# Patient Record
Sex: FEMALE | Race: BLACK OR AFRICAN AMERICAN | NOT HISPANIC OR LATINO
[De-identification: names, ages, dates, MRNs, and addresses within clinical notes are randomized per-mention and may not be internally consistent; named-entity substitution may affect disease eponyms.]

---

## 2020-10-13 ENCOUNTER — APPOINTMENT (OUTPATIENT)
Dept: HUMAN REPRODUCTION | Facility: CLINIC | Age: 30
End: 2020-10-13
Payer: COMMERCIAL

## 2020-10-13 PROCEDURE — 99204 OFFICE O/P NEW MOD 45 MIN: CPT | Mod: 95

## 2020-10-28 ENCOUNTER — APPOINTMENT (OUTPATIENT)
Dept: HUMAN REPRODUCTION | Facility: CLINIC | Age: 30
End: 2020-10-28

## 2020-11-10 ENCOUNTER — TRANSCRIPTION ENCOUNTER (OUTPATIENT)
Age: 30
End: 2020-11-10

## 2023-05-09 ENCOUNTER — APPOINTMENT (OUTPATIENT)
Dept: OBGYN | Facility: HOSPITAL | Age: 33
End: 2023-05-09

## 2023-05-09 ENCOUNTER — TRANSCRIPTION ENCOUNTER (OUTPATIENT)
Age: 33
End: 2023-05-09

## 2023-05-09 ENCOUNTER — OUTPATIENT (OUTPATIENT)
Dept: OUTPATIENT SERVICES | Facility: HOSPITAL | Age: 33
LOS: 1 days | End: 2023-05-09

## 2023-05-09 LAB
HCG UR QL: POSITIVE
QUALITY CONTROL: YES

## 2023-05-10 DIAGNOSIS — Z32.00 ENCOUNTER FOR PREGNANCY TEST, RESULT UNKNOWN: ICD-10-CM

## 2023-06-13 ENCOUNTER — RESULT REVIEW (OUTPATIENT)
Age: 33
End: 2023-06-13

## 2023-06-13 ENCOUNTER — NON-APPOINTMENT (OUTPATIENT)
Age: 33
End: 2023-06-13

## 2023-06-13 ENCOUNTER — APPOINTMENT (OUTPATIENT)
Dept: OBGYN | Facility: HOSPITAL | Age: 33
End: 2023-06-13

## 2023-06-13 ENCOUNTER — OUTPATIENT (OUTPATIENT)
Dept: OUTPATIENT SERVICES | Facility: HOSPITAL | Age: 33
LOS: 1 days | End: 2023-06-13

## 2023-06-13 VITALS
TEMPERATURE: 98.1 F | DIASTOLIC BLOOD PRESSURE: 72 MMHG | BODY MASS INDEX: 24.59 KG/M2 | WEIGHT: 153 LBS | SYSTOLIC BLOOD PRESSURE: 118 MMHG | HEIGHT: 66 IN | HEART RATE: 88 BPM

## 2023-06-13 DIAGNOSIS — O30.041 TWIN PREGNANCY, DICHORIONIC/DIAMNIOTIC, FIRST TRIMESTER: ICD-10-CM

## 2023-06-13 DIAGNOSIS — Z82.2 FAMILY HISTORY OF DEAFNESS AND HEARING LOSS: ICD-10-CM

## 2023-06-13 DIAGNOSIS — F12.91 CANNABIS USE, UNSPECIFIED, IN REMISSION: ICD-10-CM

## 2023-06-13 DIAGNOSIS — J45.909 UNSPECIFIED ASTHMA, UNCOMPLICATED: ICD-10-CM

## 2023-06-13 DIAGNOSIS — Z34.90 ENCOUNTER FOR SUPERVISION OF NORMAL PREGNANCY, UNSPECIFIED, UNSPECIFIED TRIMESTER: ICD-10-CM

## 2023-06-13 LAB
24R-OH-CALCIDIOL SERPL-MCNC: 49.1 NG/ML — SIGNIFICANT CHANGE UP (ref 30–80)
A1C WITH ESTIMATED AVERAGE GLUCOSE RESULT: 5.1 % — SIGNIFICANT CHANGE UP (ref 4–5.6)
ALBUMIN SERPL ELPH-MCNC: 4.9 G/DL — SIGNIFICANT CHANGE UP (ref 3.3–5)
ALP SERPL-CCNC: 56 U/L — SIGNIFICANT CHANGE UP (ref 40–120)
ALT FLD-CCNC: 11 U/L — SIGNIFICANT CHANGE UP (ref 4–33)
ANION GAP SERPL CALC-SCNC: 15 MMOL/L — HIGH (ref 7–14)
APPEARANCE UR: CLEAR — SIGNIFICANT CHANGE UP
AST SERPL-CCNC: 18 U/L — SIGNIFICANT CHANGE UP (ref 4–32)
BASOPHILS # BLD AUTO: 0.07 K/UL — SIGNIFICANT CHANGE UP (ref 0–0.2)
BASOPHILS NFR BLD AUTO: 0.5 % — SIGNIFICANT CHANGE UP (ref 0–2)
BILIRUB SERPL-MCNC: 0.2 MG/DL — SIGNIFICANT CHANGE UP (ref 0.2–1.2)
BILIRUB UR-MCNC: NEGATIVE — SIGNIFICANT CHANGE UP
BUN SERPL-MCNC: 12 MG/DL — SIGNIFICANT CHANGE UP (ref 7–23)
CALCIUM SERPL-MCNC: 9.9 MG/DL — SIGNIFICANT CHANGE UP (ref 8.4–10.5)
CHLORIDE SERPL-SCNC: 100 MMOL/L — SIGNIFICANT CHANGE UP (ref 98–107)
CO2 SERPL-SCNC: 21 MMOL/L — LOW (ref 22–31)
COLOR SPEC: COLORLESS — SIGNIFICANT CHANGE UP
COVID-19 SPIKE DOMAIN AB INTERP: POSITIVE
COVID-19 SPIKE DOMAIN ANTIBODY RESULT: >250 U/ML — HIGH
CREAT SERPL-MCNC: 0.62 MG/DL — SIGNIFICANT CHANGE UP (ref 0.5–1.3)
DIFF PNL FLD: NEGATIVE — SIGNIFICANT CHANGE UP
EGFR: 122 ML/MIN/1.73M2 — SIGNIFICANT CHANGE UP
EOSINOPHIL # BLD AUTO: 0.22 K/UL — SIGNIFICANT CHANGE UP (ref 0–0.5)
EOSINOPHIL NFR BLD AUTO: 1.7 % — SIGNIFICANT CHANGE UP (ref 0–6)
ESTIMATED AVERAGE GLUCOSE: 100 — SIGNIFICANT CHANGE UP
GLUCOSE SERPL-MCNC: 83 MG/DL — SIGNIFICANT CHANGE UP (ref 70–99)
GLUCOSE UR QL: NEGATIVE — SIGNIFICANT CHANGE UP
HCT VFR BLD CALC: 41 % — SIGNIFICANT CHANGE UP (ref 34.5–45)
HGB BLD-MCNC: 14.5 G/DL — SIGNIFICANT CHANGE UP (ref 11.5–15.5)
HIV 1+2 AB+HIV1 P24 AG SERPL QL IA: SIGNIFICANT CHANGE UP
IANC: 8.73 K/UL — HIGH (ref 1.8–7.4)
IMM GRANULOCYTES NFR BLD AUTO: 0.3 % — SIGNIFICANT CHANGE UP (ref 0–0.9)
KETONES UR-MCNC: NEGATIVE — SIGNIFICANT CHANGE UP
LEUKOCYTE ESTERASE UR-ACNC: NEGATIVE — SIGNIFICANT CHANGE UP
LYMPHOCYTES # BLD AUTO: 25.6 % — SIGNIFICANT CHANGE UP (ref 13–44)
LYMPHOCYTES # BLD AUTO: 3.32 K/UL — HIGH (ref 1–3.3)
MCHC RBC-ENTMCNC: 31.5 PG — SIGNIFICANT CHANGE UP (ref 27–34)
MCHC RBC-ENTMCNC: 35.4 GM/DL — SIGNIFICANT CHANGE UP (ref 32–36)
MCV RBC AUTO: 89.1 FL — SIGNIFICANT CHANGE UP (ref 80–100)
MONOCYTES # BLD AUTO: 0.6 K/UL — SIGNIFICANT CHANGE UP (ref 0–0.9)
MONOCYTES NFR BLD AUTO: 4.6 % — SIGNIFICANT CHANGE UP (ref 2–14)
NEUTROPHILS # BLD AUTO: 8.73 K/UL — HIGH (ref 1.8–7.4)
NEUTROPHILS NFR BLD AUTO: 67.3 % — SIGNIFICANT CHANGE UP (ref 43–77)
NITRITE UR-MCNC: NEGATIVE — SIGNIFICANT CHANGE UP
NRBC # BLD: 0 /100 WBCS — SIGNIFICANT CHANGE UP (ref 0–0)
NRBC # FLD: 0 K/UL — SIGNIFICANT CHANGE UP (ref 0–0)
PH UR: 7 — SIGNIFICANT CHANGE UP (ref 5–8)
PLATELET # BLD AUTO: 376 K/UL — SIGNIFICANT CHANGE UP (ref 150–400)
POTASSIUM SERPL-MCNC: 3.7 MMOL/L — SIGNIFICANT CHANGE UP (ref 3.5–5.3)
POTASSIUM SERPL-SCNC: 3.7 MMOL/L — SIGNIFICANT CHANGE UP (ref 3.5–5.3)
PROT SERPL-MCNC: 7.6 G/DL — SIGNIFICANT CHANGE UP (ref 6–8.3)
PROT UR-MCNC: NEGATIVE — SIGNIFICANT CHANGE UP
RBC # BLD: 4.6 M/UL — SIGNIFICANT CHANGE UP (ref 3.8–5.2)
RBC # FLD: 11.8 % — SIGNIFICANT CHANGE UP (ref 10.3–14.5)
RBC CASTS # UR COMP ASSIST: SIGNIFICANT CHANGE UP /HPF (ref 0–4)
SARS-COV-2 IGG+IGM SERPL QL IA: >250 U/ML — HIGH
SARS-COV-2 IGG+IGM SERPL QL IA: POSITIVE
SODIUM SERPL-SCNC: 136 MMOL/L — SIGNIFICANT CHANGE UP (ref 135–145)
SP GR SPEC: 1.01 — LOW (ref 1.01–1.05)
T4 FREE SERPL-MCNC: 1.5 NG/DL — SIGNIFICANT CHANGE UP (ref 0.9–1.8)
TSH SERPL-MCNC: <0.1 UIU/ML — LOW (ref 0.27–4.2)
URATE SERPL-MCNC: 3.2 MG/DL — SIGNIFICANT CHANGE UP (ref 2.5–7)
UROBILINOGEN FLD QL: SIGNIFICANT CHANGE UP
WBC # BLD: 12.98 K/UL — HIGH (ref 3.8–10.5)
WBC # FLD AUTO: 12.98 K/UL — HIGH (ref 3.8–10.5)
WBC UR QL: SIGNIFICANT CHANGE UP /HPF (ref 0–5)

## 2023-06-14 LAB
C TRACH RRNA SPEC QL NAA+PROBE: SIGNIFICANT CHANGE UP
C TRACH+GC RRNA SPEC QL PROBE: SIGNIFICANT CHANGE UP
CULTURE RESULTS: SIGNIFICANT CHANGE UP
HBV SURFACE AG SER-ACNC: SIGNIFICANT CHANGE UP
HCV AB S/CO SERPL IA: 0.61 S/CO — SIGNIFICANT CHANGE UP (ref 0–0.99)
HCV AB SERPL-IMP: SIGNIFICANT CHANGE UP
HCV RNA FLD QL NAA+PROBE: SIGNIFICANT CHANGE UP
HCV RNA SPEC QL PROBE+SIG AMP: SIGNIFICANT CHANGE UP
LEAD BLD-MCNC: 1.2 UG/DL — SIGNIFICANT CHANGE UP (ref 0–3.4)
MEV IGG SER-ACNC: 281 AU/ML — SIGNIFICANT CHANGE UP
MEV IGG+IGM SER-IMP: POSITIVE — SIGNIFICANT CHANGE UP
N GONORRHOEA RRNA SPEC QL NAA+PROBE: SIGNIFICANT CHANGE UP
RUBV IGG SER-ACNC: 4.5 INDEX — SIGNIFICANT CHANGE UP
RUBV IGG SER-IMP: POSITIVE — SIGNIFICANT CHANGE UP
SPECIMEN SOURCE: SIGNIFICANT CHANGE UP
T PALLIDUM AB TITR SER: NEGATIVE — SIGNIFICANT CHANGE UP
VZV IGG FLD QL IA: 1332 INDEX — SIGNIFICANT CHANGE UP
VZV IGG FLD QL IA: POSITIVE — SIGNIFICANT CHANGE UP

## 2023-06-16 LAB
CYTOLOGY SPEC DOC CYTO: SIGNIFICANT CHANGE UP
GAMMA INTERFERON BACKGROUND BLD IA-ACNC: 0.03 IU/ML — SIGNIFICANT CHANGE UP
M TB IFN-G BLD-IMP: NEGATIVE — SIGNIFICANT CHANGE UP
M TB IFN-G CD4+ BCKGRND COR BLD-ACNC: 0.01 IU/ML — SIGNIFICANT CHANGE UP
M TB IFN-G CD4+CD8+ BCKGRND COR BLD-ACNC: 0 IU/ML — SIGNIFICANT CHANGE UP
QUANT TB PLUS MITOGEN MINUS NIL: >10 IU/ML — SIGNIFICANT CHANGE UP

## 2023-06-19 ENCOUNTER — NON-APPOINTMENT (OUTPATIENT)
Age: 33
End: 2023-06-19

## 2023-06-21 ENCOUNTER — NON-APPOINTMENT (OUTPATIENT)
Age: 33
End: 2023-06-21

## 2023-06-26 ENCOUNTER — OUTPATIENT (OUTPATIENT)
Dept: OUTPATIENT SERVICES | Facility: HOSPITAL | Age: 33
LOS: 1 days | End: 2023-06-26

## 2023-06-26 ENCOUNTER — APPOINTMENT (OUTPATIENT)
Dept: MATERNAL FETAL MEDICINE | Facility: HOSPITAL | Age: 33
End: 2023-06-26
Payer: MEDICAID

## 2023-06-26 ENCOUNTER — APPOINTMENT (OUTPATIENT)
Dept: OBGYN | Facility: HOSPITAL | Age: 33
End: 2023-06-26
Payer: MEDICAID

## 2023-06-26 ENCOUNTER — ASOB RESULT (OUTPATIENT)
Age: 33
End: 2023-06-26

## 2023-06-26 VITALS
DIASTOLIC BLOOD PRESSURE: 67 MMHG | HEART RATE: 78 BPM | SYSTOLIC BLOOD PRESSURE: 111 MMHG | TEMPERATURE: 98.2 F | BODY MASS INDEX: 24.03 KG/M2 | WEIGHT: 149.5 LBS | HEIGHT: 66 IN

## 2023-06-26 PROCEDURE — 76814 OB US NUCHAL MEAS ADD-ON: CPT | Mod: 26

## 2023-06-26 PROCEDURE — 76813 OB US NUCHAL MEAS 1 GEST: CPT | Mod: 26

## 2023-06-26 PROCEDURE — 76802 OB US < 14 WKS ADDL FETUS: CPT | Mod: 26

## 2023-06-26 PROCEDURE — 76801 OB US < 14 WKS SINGLE FETUS: CPT | Mod: 26,59

## 2023-06-26 RX ORDER — POLYETHYLENE GLYCOL 3350 17 G/17G
17 POWDER, FOR SOLUTION ORAL DAILY
Qty: 1 | Refills: 0 | Status: ACTIVE | COMMUNITY
Start: 2023-06-26 | End: 1900-01-01

## 2023-06-26 RX ORDER — ASPIRIN 81 MG/1
81 TABLET, COATED ORAL DAILY
Qty: 60 | Refills: 3 | Status: ACTIVE | COMMUNITY
Start: 2023-06-26 | End: 1900-01-01

## 2023-06-28 DIAGNOSIS — O30.041 TWIN PREGNANCY, DICHORIONIC/DIAMNIOTIC, FIRST TRIMESTER: ICD-10-CM

## 2023-06-28 DIAGNOSIS — K59.00 CONSTIPATION, UNSPECIFIED: ICD-10-CM

## 2023-07-03 ENCOUNTER — NON-APPOINTMENT (OUTPATIENT)
Age: 33
End: 2023-07-03

## 2023-07-17 ENCOUNTER — NON-APPOINTMENT (OUTPATIENT)
Age: 33
End: 2023-07-17

## 2023-07-24 ENCOUNTER — APPOINTMENT (OUTPATIENT)
Dept: OBGYN | Facility: HOSPITAL | Age: 33
End: 2023-07-24
Payer: MEDICAID

## 2023-07-24 ENCOUNTER — ASOB RESULT (OUTPATIENT)
Age: 33
End: 2023-07-24

## 2023-07-24 ENCOUNTER — APPOINTMENT (OUTPATIENT)
Dept: MATERNAL FETAL MEDICINE | Facility: HOSPITAL | Age: 33
End: 2023-07-24
Payer: MEDICAID

## 2023-07-24 ENCOUNTER — NON-APPOINTMENT (OUTPATIENT)
Age: 33
End: 2023-07-24

## 2023-07-24 PROCEDURE — 76815 OB US LIMITED FETUS(S): CPT | Mod: 26

## 2023-07-24 PROCEDURE — 76817 TRANSVAGINAL US OBSTETRIC: CPT | Mod: 26

## 2023-07-25 ENCOUNTER — RESULT REVIEW (OUTPATIENT)
Age: 33
End: 2023-07-25

## 2023-07-25 ENCOUNTER — OUTPATIENT (OUTPATIENT)
Dept: OUTPATIENT SERVICES | Facility: HOSPITAL | Age: 33
LOS: 1 days | End: 2023-07-25

## 2023-07-25 ENCOUNTER — APPOINTMENT (OUTPATIENT)
Dept: OBGYN | Facility: HOSPITAL | Age: 33
End: 2023-07-25

## 2023-07-25 VITALS
HEART RATE: 78 BPM | TEMPERATURE: 98.1 F | BODY MASS INDEX: 24.05 KG/M2 | SYSTOLIC BLOOD PRESSURE: 117 MMHG | WEIGHT: 149 LBS | DIASTOLIC BLOOD PRESSURE: 63 MMHG

## 2023-07-25 DIAGNOSIS — Z32.00 ENCOUNTER FOR PREGNANCY TEST, RESULT UNKNOWN: ICD-10-CM

## 2023-07-26 DIAGNOSIS — O30.042 TWIN PREGNANCY, DICHORIONIC/DIAMNIOTIC, SECOND TRIMESTER: ICD-10-CM

## 2023-07-26 DIAGNOSIS — Z13.79 ENCOUNTER FOR OTHER SCREENING FOR GENETIC AND CHROMOSOMAL ANOMALIES: ICD-10-CM

## 2023-07-29 LAB
AFP ADJ MOM SERPL: SIGNIFICANT CHANGE UP
AFP INTERP SERPL-IMP: SIGNIFICANT CHANGE UP
AFP INTERP SERPL-IMP: SIGNIFICANT CHANGE UP
AFP SERPL-MCNC: 136.4 NG/ML — SIGNIFICANT CHANGE UP
AGE AT DELIVERY: 31.7 YR — SIGNIFICANT CHANGE UP
ALPHA FETOPROTEIN SERUM COMMENT: SIGNIFICANT CHANGE UP
ALPHA FETOPROTEIN SERUM RESULTS: SIGNIFICANT CHANGE UP
GA METHOD: SIGNIFICANT CHANGE UP
GA: 13.9 WEEKS — SIGNIFICANT CHANGE UP
IDDM PATIENT QL: NO — SIGNIFICANT CHANGE UP
MULTIPLE PREGNANCY: SIGNIFICANT CHANGE UP
NEURAL TUBE DEFECT RISK FETUS: SIGNIFICANT CHANGE UP
RACE AFP: SIGNIFICANT CHANGE UP

## 2023-08-01 ENCOUNTER — NON-APPOINTMENT (OUTPATIENT)
Age: 33
End: 2023-08-01

## 2023-08-17 ENCOUNTER — APPOINTMENT (OUTPATIENT)
Dept: OBGYN | Facility: HOSPITAL | Age: 33
End: 2023-08-17

## 2023-08-21 ENCOUNTER — APPOINTMENT (OUTPATIENT)
Dept: MATERNAL FETAL MEDICINE | Facility: HOSPITAL | Age: 33
End: 2023-08-21

## 2023-08-23 ENCOUNTER — NON-APPOINTMENT (OUTPATIENT)
Age: 33
End: 2023-08-23

## 2023-08-24 ENCOUNTER — OUTPATIENT (OUTPATIENT)
Dept: OUTPATIENT SERVICES | Facility: HOSPITAL | Age: 33
LOS: 1 days | End: 2023-08-24

## 2023-08-24 ENCOUNTER — ASOB RESULT (OUTPATIENT)
Age: 33
End: 2023-08-24

## 2023-08-24 ENCOUNTER — APPOINTMENT (OUTPATIENT)
Dept: MATERNAL FETAL MEDICINE | Facility: HOSPITAL | Age: 33
End: 2023-08-24
Payer: MEDICAID

## 2023-08-24 ENCOUNTER — APPOINTMENT (OUTPATIENT)
Dept: OBGYN | Facility: HOSPITAL | Age: 33
End: 2023-08-24
Payer: MEDICAID

## 2023-08-24 VITALS
DIASTOLIC BLOOD PRESSURE: 62 MMHG | HEART RATE: 72 BPM | BODY MASS INDEX: 24.86 KG/M2 | TEMPERATURE: 97.7 F | SYSTOLIC BLOOD PRESSURE: 117 MMHG | WEIGHT: 154 LBS

## 2023-08-24 PROCEDURE — 76811 OB US DETAILED SNGL FETUS: CPT | Mod: 26

## 2023-08-24 PROCEDURE — 76812 OB US DETAILED ADDL FETUS: CPT | Mod: 26,59

## 2023-08-24 PROCEDURE — 76817 TRANSVAGINAL US OBSTETRIC: CPT | Mod: 26,59

## 2023-08-25 DIAGNOSIS — Z34.82 ENCOUNTER FOR SUPERVISION OF OTHER NORMAL PREGNANCY, SECOND TRIMESTER: ICD-10-CM

## 2023-08-25 DIAGNOSIS — O30.042 TWIN PREGNANCY, DICHORIONIC/DIAMNIOTIC, SECOND TRIMESTER: ICD-10-CM

## 2023-08-28 ENCOUNTER — NON-APPOINTMENT (OUTPATIENT)
Age: 33
End: 2023-08-28

## 2023-09-07 ENCOUNTER — APPOINTMENT (OUTPATIENT)
Dept: ANTEPARTUM | Facility: CLINIC | Age: 33
End: 2023-09-07
Payer: MEDICAID

## 2023-09-07 ENCOUNTER — ASOB RESULT (OUTPATIENT)
Age: 33
End: 2023-09-07

## 2023-09-07 PROCEDURE — 76816 OB US FOLLOW-UP PER FETUS: CPT | Mod: 59

## 2023-09-07 PROCEDURE — 99213 OFFICE O/P EST LOW 20 MIN: CPT | Mod: TH,25

## 2023-09-07 PROCEDURE — 76817 TRANSVAGINAL US OBSTETRIC: CPT

## 2023-09-14 ENCOUNTER — APPOINTMENT (OUTPATIENT)
Dept: ANTEPARTUM | Facility: CLINIC | Age: 33
End: 2023-09-14
Payer: MEDICAID

## 2023-09-14 ENCOUNTER — ASOB RESULT (OUTPATIENT)
Age: 33
End: 2023-09-14

## 2023-09-14 PROCEDURE — 76815 OB US LIMITED FETUS(S): CPT

## 2023-09-14 PROCEDURE — 76817 TRANSVAGINAL US OBSTETRIC: CPT

## 2023-09-20 ENCOUNTER — NON-APPOINTMENT (OUTPATIENT)
Age: 33
End: 2023-09-20

## 2023-09-20 ENCOUNTER — RESULT CHARGE (OUTPATIENT)
Age: 33
End: 2023-09-20

## 2023-09-21 ENCOUNTER — OUTPATIENT (OUTPATIENT)
Dept: OUTPATIENT SERVICES | Facility: HOSPITAL | Age: 33
LOS: 1 days | End: 2023-09-21

## 2023-09-21 ENCOUNTER — ASOB RESULT (OUTPATIENT)
Age: 33
End: 2023-09-21

## 2023-09-21 ENCOUNTER — NON-APPOINTMENT (OUTPATIENT)
Age: 33
End: 2023-09-21

## 2023-09-21 ENCOUNTER — APPOINTMENT (OUTPATIENT)
Dept: OBGYN | Facility: HOSPITAL | Age: 33
End: 2023-09-21

## 2023-09-21 ENCOUNTER — APPOINTMENT (OUTPATIENT)
Dept: ANTEPARTUM | Facility: CLINIC | Age: 33
End: 2023-09-21
Payer: MEDICAID

## 2023-09-21 ENCOUNTER — MED ADMIN CHARGE (OUTPATIENT)
Age: 33
End: 2023-09-21

## 2023-09-21 VITALS
DIASTOLIC BLOOD PRESSURE: 83 MMHG | SYSTOLIC BLOOD PRESSURE: 128 MMHG | WEIGHT: 159 LBS | TEMPERATURE: 97.9 F | HEART RATE: 86 BPM | BODY MASS INDEX: 25.66 KG/M2

## 2023-09-21 DIAGNOSIS — Z23 ENCOUNTER FOR IMMUNIZATION: ICD-10-CM

## 2023-09-21 DIAGNOSIS — O26.879 CERVICAL SHORTENING, UNSPECIFIED TRIMESTER: ICD-10-CM

## 2023-09-21 DIAGNOSIS — O30.042 TWIN PREGNANCY, DICHORIONIC/DIAMNIOTIC, SECOND TRIMESTER: ICD-10-CM

## 2023-09-21 DIAGNOSIS — Z34.90 ENCOUNTER FOR SUPERVISION OF NORMAL PREGNANCY, UNSPECIFIED, UNSPECIFIED TRIMESTER: ICD-10-CM

## 2023-09-21 DIAGNOSIS — O28.3 ABNORMAL ULTRASONIC FINDING ON ANTENATAL SCREENING OF MOTHER: ICD-10-CM

## 2023-09-21 PROCEDURE — 76815 OB US LIMITED FETUS(S): CPT

## 2023-09-21 PROCEDURE — 76817 TRANSVAGINAL US OBSTETRIC: CPT

## 2023-10-17 ENCOUNTER — RESULT REVIEW (OUTPATIENT)
Age: 33
End: 2023-10-17

## 2023-10-17 ENCOUNTER — APPOINTMENT (OUTPATIENT)
Dept: OBGYN | Facility: HOSPITAL | Age: 33
End: 2023-10-17

## 2023-10-17 ENCOUNTER — MED ADMIN CHARGE (OUTPATIENT)
Age: 33
End: 2023-10-17

## 2023-10-17 ENCOUNTER — INPATIENT (INPATIENT)
Facility: HOSPITAL | Age: 33
LOS: 1 days | Discharge: HOME | End: 2023-10-19
Attending: SPECIALIST | Admitting: SPECIALIST
Payer: MEDICAID

## 2023-10-17 ENCOUNTER — APPOINTMENT (OUTPATIENT)
Dept: ANTEPARTUM | Facility: CLINIC | Age: 33
End: 2023-10-17
Payer: MEDICAID

## 2023-10-17 ENCOUNTER — ASOB RESULT (OUTPATIENT)
Age: 33
End: 2023-10-17

## 2023-10-17 ENCOUNTER — APPOINTMENT (OUTPATIENT)
Dept: ANTEPARTUM | Facility: CLINIC | Age: 33
End: 2023-10-17

## 2023-10-17 ENCOUNTER — OUTPATIENT (OUTPATIENT)
Dept: OUTPATIENT SERVICES | Facility: HOSPITAL | Age: 33
LOS: 1 days | End: 2023-10-17

## 2023-10-17 VITALS
SYSTOLIC BLOOD PRESSURE: 109 MMHG | HEIGHT: 66 IN | WEIGHT: 164 LBS | HEART RATE: 83 BPM | TEMPERATURE: 97.6 F | BODY MASS INDEX: 26.36 KG/M2 | DIASTOLIC BLOOD PRESSURE: 63 MMHG

## 2023-10-17 VITALS
DIASTOLIC BLOOD PRESSURE: 64 MMHG | HEART RATE: 86 BPM | SYSTOLIC BLOOD PRESSURE: 128 MMHG | TEMPERATURE: 99 F | RESPIRATION RATE: 17 BRPM

## 2023-10-17 DIAGNOSIS — Z00.00 ENCOUNTER FOR GENERAL ADULT MEDICAL EXAMINATION WITHOUT ABNORMAL FINDINGS: ICD-10-CM

## 2023-10-17 DIAGNOSIS — Z23 ENCOUNTER FOR IMMUNIZATION: ICD-10-CM

## 2023-10-17 DIAGNOSIS — Z33.2 ENCOUNTER FOR ELECTIVE TERMINATION OF PREGNANCY: Chronic | ICD-10-CM

## 2023-10-17 DIAGNOSIS — O26.899 OTHER SPECIFIED PREGNANCY RELATED CONDITIONS, UNSPECIFIED TRIMESTER: ICD-10-CM

## 2023-10-17 DIAGNOSIS — Z98.890 OTHER SPECIFIED POSTPROCEDURAL STATES: Chronic | ICD-10-CM

## 2023-10-17 DIAGNOSIS — Z00.00 ENCOUNTER FOR GENERAL ADULT MEDICAL EXAMINATION W/OUT ABNORMAL FINDINGS: ICD-10-CM

## 2023-10-17 DIAGNOSIS — Z34.90 ENCOUNTER FOR SUPERVISION OF NORMAL PREGNANCY, UNSPECIFIED, UNSPECIFIED TRIMESTER: ICD-10-CM

## 2023-10-17 DIAGNOSIS — O30.009 TWIN PREGNANCY, UNSPECIFIED NUMBER OF PLACENTA AND UNSPECIFIED NUMBER OF AMNIOTIC SACS, UNSPECIFIED TRIMESTER: ICD-10-CM

## 2023-10-17 DIAGNOSIS — O60.03 PRETERM LABOR WITHOUT DELIVERY, THIRD TRIMESTER: ICD-10-CM

## 2023-10-17 LAB
APPEARANCE UR: CLEAR — SIGNIFICANT CHANGE UP
APPEARANCE UR: CLEAR — SIGNIFICANT CHANGE UP
BASOPHILS # BLD AUTO: 0.05 K/UL — SIGNIFICANT CHANGE UP (ref 0–0.2)
BASOPHILS # BLD AUTO: 0.05 K/UL — SIGNIFICANT CHANGE UP (ref 0–0.2)
BASOPHILS # BLD AUTO: 0.06 K/UL — SIGNIFICANT CHANGE UP (ref 0–0.2)
BASOPHILS # BLD AUTO: 0.06 K/UL — SIGNIFICANT CHANGE UP (ref 0–0.2)
BASOPHILS NFR BLD AUTO: 0.4 % — SIGNIFICANT CHANGE UP (ref 0–2)
BILIRUB UR-MCNC: NEGATIVE — SIGNIFICANT CHANGE UP
BILIRUB UR-MCNC: NEGATIVE — SIGNIFICANT CHANGE UP
BLD GP AB SCN SERPL QL: NEGATIVE — SIGNIFICANT CHANGE UP
BLD GP AB SCN SERPL QL: NEGATIVE — SIGNIFICANT CHANGE UP
COLOR SPEC: YELLOW — SIGNIFICANT CHANGE UP
COLOR SPEC: YELLOW — SIGNIFICANT CHANGE UP
DIFF PNL FLD: NEGATIVE — SIGNIFICANT CHANGE UP
DIFF PNL FLD: NEGATIVE — SIGNIFICANT CHANGE UP
EOSINOPHIL # BLD AUTO: 0.1 K/UL — SIGNIFICANT CHANGE UP (ref 0–0.5)
EOSINOPHIL # BLD AUTO: 0.1 K/UL — SIGNIFICANT CHANGE UP (ref 0–0.5)
EOSINOPHIL # BLD AUTO: 0.13 K/UL — SIGNIFICANT CHANGE UP (ref 0–0.5)
EOSINOPHIL # BLD AUTO: 0.13 K/UL — SIGNIFICANT CHANGE UP (ref 0–0.5)
EOSINOPHIL NFR BLD AUTO: 0.7 % — SIGNIFICANT CHANGE UP (ref 0–6)
EOSINOPHIL NFR BLD AUTO: 0.7 % — SIGNIFICANT CHANGE UP (ref 0–6)
EOSINOPHIL NFR BLD AUTO: 1.1 % — SIGNIFICANT CHANGE UP (ref 0–6)
EOSINOPHIL NFR BLD AUTO: 1.1 % — SIGNIFICANT CHANGE UP (ref 0–6)
GLUCOSE 1H P MEAL SERPL-MCNC: 96 MG/DL — SIGNIFICANT CHANGE UP (ref 70–134)
GLUCOSE 1H P MEAL SERPL-MCNC: 96 MG/DL — SIGNIFICANT CHANGE UP (ref 70–134)
GLUCOSE UR QL: NEGATIVE MG/DL — SIGNIFICANT CHANGE UP
GLUCOSE UR QL: NEGATIVE MG/DL — SIGNIFICANT CHANGE UP
HCT VFR BLD CALC: 38.2 % — SIGNIFICANT CHANGE UP (ref 34.5–45)
HCT VFR BLD CALC: 38.2 % — SIGNIFICANT CHANGE UP (ref 34.5–45)
HCT VFR BLD CALC: 39.7 % — SIGNIFICANT CHANGE UP (ref 34.5–45)
HCT VFR BLD CALC: 39.7 % — SIGNIFICANT CHANGE UP (ref 34.5–45)
HGB BLD-MCNC: 12.9 G/DL — SIGNIFICANT CHANGE UP (ref 11.5–15.5)
HGB BLD-MCNC: 12.9 G/DL — SIGNIFICANT CHANGE UP (ref 11.5–15.5)
HGB BLD-MCNC: 13.5 G/DL — SIGNIFICANT CHANGE UP (ref 11.5–15.5)
HGB BLD-MCNC: 13.5 G/DL — SIGNIFICANT CHANGE UP (ref 11.5–15.5)
IANC: 10.57 K/UL — HIGH (ref 1.8–7.4)
IANC: 10.57 K/UL — HIGH (ref 1.8–7.4)
IANC: 8.5 K/UL — HIGH (ref 1.8–7.4)
IANC: 8.5 K/UL — HIGH (ref 1.8–7.4)
IMM GRANULOCYTES NFR BLD AUTO: 0.3 % — SIGNIFICANT CHANGE UP (ref 0–0.9)
IMM GRANULOCYTES NFR BLD AUTO: 0.3 % — SIGNIFICANT CHANGE UP (ref 0–0.9)
IMM GRANULOCYTES NFR BLD AUTO: 0.4 % — SIGNIFICANT CHANGE UP (ref 0–0.9)
IMM GRANULOCYTES NFR BLD AUTO: 0.4 % — SIGNIFICANT CHANGE UP (ref 0–0.9)
KETONES UR-MCNC: NEGATIVE MG/DL — SIGNIFICANT CHANGE UP
KETONES UR-MCNC: NEGATIVE MG/DL — SIGNIFICANT CHANGE UP
LEUKOCYTE ESTERASE UR-ACNC: NEGATIVE — SIGNIFICANT CHANGE UP
LEUKOCYTE ESTERASE UR-ACNC: NEGATIVE — SIGNIFICANT CHANGE UP
LYMPHOCYTES # BLD AUTO: 2.58 K/UL — SIGNIFICANT CHANGE UP (ref 1–3.3)
LYMPHOCYTES # BLD AUTO: 2.58 K/UL — SIGNIFICANT CHANGE UP (ref 1–3.3)
LYMPHOCYTES # BLD AUTO: 21.4 % — SIGNIFICANT CHANGE UP (ref 13–44)
LYMPHOCYTES # BLD AUTO: 3.14 K/UL — SIGNIFICANT CHANGE UP (ref 1–3.3)
LYMPHOCYTES # BLD AUTO: 3.14 K/UL — SIGNIFICANT CHANGE UP (ref 1–3.3)
MCHC RBC-ENTMCNC: 32.2 PG — SIGNIFICANT CHANGE UP (ref 27–34)
MCHC RBC-ENTMCNC: 32.2 PG — SIGNIFICANT CHANGE UP (ref 27–34)
MCHC RBC-ENTMCNC: 32.6 PG — SIGNIFICANT CHANGE UP (ref 27–34)
MCHC RBC-ENTMCNC: 32.6 PG — SIGNIFICANT CHANGE UP (ref 27–34)
MCHC RBC-ENTMCNC: 33.8 GM/DL — SIGNIFICANT CHANGE UP (ref 32–36)
MCHC RBC-ENTMCNC: 33.8 GM/DL — SIGNIFICANT CHANGE UP (ref 32–36)
MCHC RBC-ENTMCNC: 34 GM/DL — SIGNIFICANT CHANGE UP (ref 32–36)
MCHC RBC-ENTMCNC: 34 GM/DL — SIGNIFICANT CHANGE UP (ref 32–36)
MCV RBC AUTO: 95.3 FL — SIGNIFICANT CHANGE UP (ref 80–100)
MCV RBC AUTO: 95.3 FL — SIGNIFICANT CHANGE UP (ref 80–100)
MCV RBC AUTO: 95.9 FL — SIGNIFICANT CHANGE UP (ref 80–100)
MCV RBC AUTO: 95.9 FL — SIGNIFICANT CHANGE UP (ref 80–100)
MONOCYTES # BLD AUTO: 0.74 K/UL — SIGNIFICANT CHANGE UP (ref 0–0.9)
MONOCYTES # BLD AUTO: 0.74 K/UL — SIGNIFICANT CHANGE UP (ref 0–0.9)
MONOCYTES # BLD AUTO: 0.75 K/UL — SIGNIFICANT CHANGE UP (ref 0–0.9)
MONOCYTES # BLD AUTO: 0.75 K/UL — SIGNIFICANT CHANGE UP (ref 0–0.9)
MONOCYTES NFR BLD AUTO: 5 % — SIGNIFICANT CHANGE UP (ref 2–14)
MONOCYTES NFR BLD AUTO: 5 % — SIGNIFICANT CHANGE UP (ref 2–14)
MONOCYTES NFR BLD AUTO: 6.2 % — SIGNIFICANT CHANGE UP (ref 2–14)
MONOCYTES NFR BLD AUTO: 6.2 % — SIGNIFICANT CHANGE UP (ref 2–14)
NEUTROPHILS # BLD AUTO: 10.57 K/UL — HIGH (ref 1.8–7.4)
NEUTROPHILS # BLD AUTO: 10.57 K/UL — HIGH (ref 1.8–7.4)
NEUTROPHILS # BLD AUTO: 8.5 K/UL — HIGH (ref 1.8–7.4)
NEUTROPHILS # BLD AUTO: 8.5 K/UL — HIGH (ref 1.8–7.4)
NEUTROPHILS NFR BLD AUTO: 70.5 % — SIGNIFICANT CHANGE UP (ref 43–77)
NEUTROPHILS NFR BLD AUTO: 70.5 % — SIGNIFICANT CHANGE UP (ref 43–77)
NEUTROPHILS NFR BLD AUTO: 72.2 % — SIGNIFICANT CHANGE UP (ref 43–77)
NEUTROPHILS NFR BLD AUTO: 72.2 % — SIGNIFICANT CHANGE UP (ref 43–77)
NITRITE UR-MCNC: NEGATIVE — SIGNIFICANT CHANGE UP
NITRITE UR-MCNC: NEGATIVE — SIGNIFICANT CHANGE UP
NRBC # BLD: 0 /100 WBCS — SIGNIFICANT CHANGE UP (ref 0–0)
NRBC # FLD: 0 K/UL — SIGNIFICANT CHANGE UP (ref 0–0)
PH UR: 7.5 — SIGNIFICANT CHANGE UP (ref 5–8)
PH UR: 7.5 — SIGNIFICANT CHANGE UP (ref 5–8)
PLATELET # BLD AUTO: 286 K/UL — SIGNIFICANT CHANGE UP (ref 150–400)
PLATELET # BLD AUTO: 286 K/UL — SIGNIFICANT CHANGE UP (ref 150–400)
PLATELET # BLD AUTO: 327 K/UL — SIGNIFICANT CHANGE UP (ref 150–400)
PLATELET # BLD AUTO: 327 K/UL — SIGNIFICANT CHANGE UP (ref 150–400)
PROT UR-MCNC: NEGATIVE MG/DL — SIGNIFICANT CHANGE UP
PROT UR-MCNC: NEGATIVE MG/DL — SIGNIFICANT CHANGE UP
RBC # BLD: 4.01 M/UL — SIGNIFICANT CHANGE UP (ref 3.8–5.2)
RBC # BLD: 4.01 M/UL — SIGNIFICANT CHANGE UP (ref 3.8–5.2)
RBC # BLD: 4.14 M/UL — SIGNIFICANT CHANGE UP (ref 3.8–5.2)
RBC # BLD: 4.14 M/UL — SIGNIFICANT CHANGE UP (ref 3.8–5.2)
RBC # FLD: 13.4 % — SIGNIFICANT CHANGE UP (ref 10.3–14.5)
RBC # FLD: 13.4 % — SIGNIFICANT CHANGE UP (ref 10.3–14.5)
RBC # FLD: 13.5 % — SIGNIFICANT CHANGE UP (ref 10.3–14.5)
RBC # FLD: 13.5 % — SIGNIFICANT CHANGE UP (ref 10.3–14.5)
RH IG SCN BLD-IMP: POSITIVE — SIGNIFICANT CHANGE UP
RH IG SCN BLD-IMP: POSITIVE — SIGNIFICANT CHANGE UP
SP GR SPEC: 1.01 — SIGNIFICANT CHANGE UP (ref 1–1.03)
SP GR SPEC: 1.01 — SIGNIFICANT CHANGE UP (ref 1–1.03)
UROBILINOGEN FLD QL: 0.2 MG/DL — SIGNIFICANT CHANGE UP (ref 0.2–1)
UROBILINOGEN FLD QL: 0.2 MG/DL — SIGNIFICANT CHANGE UP (ref 0.2–1)
WBC # BLD: 12.06 K/UL — HIGH (ref 3.8–10.5)
WBC # BLD: 12.06 K/UL — HIGH (ref 3.8–10.5)
WBC # BLD: 14.66 K/UL — HIGH (ref 3.8–10.5)
WBC # BLD: 14.66 K/UL — HIGH (ref 3.8–10.5)
WBC # FLD AUTO: 12.06 K/UL — HIGH (ref 3.8–10.5)
WBC # FLD AUTO: 12.06 K/UL — HIGH (ref 3.8–10.5)
WBC # FLD AUTO: 14.66 K/UL — HIGH (ref 3.8–10.5)
WBC # FLD AUTO: 14.66 K/UL — HIGH (ref 3.8–10.5)

## 2023-10-17 PROCEDURE — 76817 TRANSVAGINAL US OBSTETRIC: CPT

## 2023-10-17 PROCEDURE — 76816 OB US FOLLOW-UP PER FETUS: CPT | Mod: 59

## 2023-10-17 PROCEDURE — 99235 HOSP IP/OBS SAME DATE MOD 70: CPT | Mod: GC

## 2023-10-17 RX ORDER — MAGNESIUM SULFATE 500 MG/ML
4 VIAL (ML) INJECTION ONCE
Refills: 0 | Status: COMPLETED | OUTPATIENT
Start: 2023-10-17 | End: 2023-10-17

## 2023-10-17 RX ORDER — INDOMETHACIN 50 MG
25 CAPSULE ORAL EVERY 6 HOURS
Refills: 0 | Status: DISCONTINUED | OUTPATIENT
Start: 2023-10-17 | End: 2023-10-19

## 2023-10-17 RX ORDER — AMPICILLIN TRIHYDRATE 250 MG
2 CAPSULE ORAL ONCE
Refills: 0 | Status: COMPLETED | OUTPATIENT
Start: 2023-10-17 | End: 2023-10-17

## 2023-10-17 RX ORDER — INFLUENZA VIRUS VACCINE 15; 15; 15; 15 UG/.5ML; UG/.5ML; UG/.5ML; UG/.5ML
0.5 SUSPENSION INTRAMUSCULAR ONCE
Refills: 0 | Status: DISCONTINUED | OUTPATIENT
Start: 2023-10-17 | End: 2023-10-19

## 2023-10-17 RX ORDER — SODIUM CHLORIDE 9 MG/ML
1000 INJECTION, SOLUTION INTRAVENOUS
Refills: 0 | Status: DISCONTINUED | OUTPATIENT
Start: 2023-10-17 | End: 2023-10-18

## 2023-10-17 RX ORDER — OXYTOCIN 10 UNIT/ML
333.33 VIAL (ML) INJECTION
Qty: 20 | Refills: 0 | Status: DISCONTINUED | OUTPATIENT
Start: 2023-10-17 | End: 2023-10-18

## 2023-10-17 RX ORDER — INDOMETHACIN 50 MG
50 CAPSULE ORAL ONCE
Refills: 0 | Status: COMPLETED | OUTPATIENT
Start: 2023-10-17 | End: 2023-10-17

## 2023-10-17 RX ORDER — ASPIRIN 81 MG/1
81 TABLET, DELAYED RELEASE ORAL
Qty: 60 | Refills: 5 | Status: ACTIVE | COMMUNITY
Start: 2023-10-17 | End: 1900-01-01

## 2023-10-17 RX ORDER — MAGNESIUM SULFATE 500 MG/ML
2 VIAL (ML) INJECTION
Qty: 40 | Refills: 0 | Status: DISCONTINUED | OUTPATIENT
Start: 2023-10-17 | End: 2023-10-18

## 2023-10-17 RX ORDER — AMPICILLIN TRIHYDRATE 250 MG
1 CAPSULE ORAL EVERY 4 HOURS
Refills: 0 | Status: DISCONTINUED | OUTPATIENT
Start: 2023-10-17 | End: 2023-10-18

## 2023-10-17 RX ORDER — CHLORHEXIDINE GLUCONATE 213 G/1000ML
1 SOLUTION TOPICAL DAILY
Refills: 0 | Status: DISCONTINUED | OUTPATIENT
Start: 2023-10-17 | End: 2023-10-19

## 2023-10-17 RX ADMIN — Medication 108 GRAM(S): at 21:17

## 2023-10-17 RX ADMIN — Medication 12 MILLIGRAM(S): at 17:05

## 2023-10-17 RX ADMIN — Medication 200 GRAM(S): at 17:12

## 2023-10-17 RX ADMIN — Medication 50 MILLIGRAM(S): at 17:03

## 2023-10-17 RX ADMIN — Medication 25 MILLIGRAM(S): at 23:00

## 2023-10-17 RX ADMIN — Medication 50 GM/HR: at 19:19

## 2023-10-17 RX ADMIN — Medication 300 GRAM(S): at 16:50

## 2023-10-17 RX ADMIN — Medication 50 GM/HR: at 17:11

## 2023-10-17 NOTE — OB RN PATIENT PROFILE - BILL OF RIGHTS/ADMISSION INFORMATION PROVIDED TO:
Patient declines family notificatin of hospitalization/Patient Patient declines family notification of hospitalization/Patient

## 2023-10-17 NOTE — OB PROVIDER H&P - HISTORY OF PRESENT ILLNESS
34yo female  @ 27.4 wks with di-di TIUP here from the ATU for evaluation of short cervix. Pt with a cervical length of 1.33 cm on 9/21 and was started on PV progesterone, and today the cervix was measured at 0.3 with a deep funnel. Pt reports GFM and denies VB/ LOF. Pt denies ctx's or cramping.    AP course complicated by short cervix and was started on PV progesterone 9/21/20203

## 2023-10-17 NOTE — OB PROVIDER TRIAGE NOTE - NSOBPROVIDERNOTE_OBGYN_ALL_OB_FT
32yo female  @ 27.4 wks with di-di TIUP here with short cervix of 0.3 cm with deep funnel  -cervix was 1.33 on 9/21/23  -fetuses are vtx/transverse 34yo female  @ 27.4 wks with di-di TIUP here with short cervix of 0.3 cm with deep funnel  -cervix was 1.33 on 9/21/23  -fetuses are vtx/transverse  -pt with frequent uterine activity  -pt was dw Dr Yeager  -pt was admitted for PTL  -pt was admitted for betamethasone, MgSO4, ampicillin pending GBS culture, Indocin  -GBS cx collected and sent

## 2023-10-17 NOTE — OB PROVIDER H&P - ASSESSMENT
32yo female  @ 27.4 wks with di-di TIUP here with short cervix of 0.3 cm with deep funnel  -cervix was 1.33 on 9/21/23  -fetuses are vtx/transverse  -pt with frequent uterine activity  -pt was dw Dr Yeager  -pt was admitted for PTL  -pt was admitted for betamethasone, MgSO4, ampicillin pending GBS culture, Indocin  -GBS cx collected and sent

## 2023-10-17 NOTE — PROGRESS NOTE ADULT - ASSESSMENT
A/P: 33 year old  at 27w4d with gracia twin pregnancy is currently undergoing evaluation in triage for new finding of short cervix. Asxs. VSS. Patient w/ 0.3cm cervix, 1cm dilated. EFM Reassuring, ctx regularly. Overall clinical picture c/f PTL in these gracia twins. Rec BMTZ, mg gtt, tocolysis w/ indocin, amp for GBS unknown. Continue to monitor on L&D. Will continue to follow.    Patient seen w/ Dr. Perez (M attending)    Mikael Yeager M.D. FACOG PGY-7  Maternal Fetal Medicine Fellow  Cell: [phone] if after 5pm or weekend ask labor and delivery for on call fellow

## 2023-10-17 NOTE — OB PROVIDER TRIAGE NOTE - NSHPPHYSICALEXAM_GEN_ALL_CORE
ICU Vital Signs Last 24 Hrs  T(C): 37.2 (17 Oct 2023 14:54), Max: 37.2 (17 Oct 2023 14:54)  T(F): 98.96 (17 Oct 2023 14:54), Max: 98.96 (17 Oct 2023 14:54)  HR: 86 (17 Oct 2023 15:01) (86 - 86)  BP: 128/64 (17 Oct 2023 15:01) (128/64 - 128/64)  BP(mean): --  ABP: --  ABP(mean): --  RR: 16 (17 Oct 2023 14:54) (16 - 16)  SpO2: --    O2 Parameters below as of 17 Oct 2023 14:36  Patient On (Oxygen Delivery Method): room air    Gen: A&O x 3; NAD    Neuro- symmetrical facial features with no slurring of words  Pulm- breathing unlabored  Abd exam- soft and nontender  Extremities- full range of motion x 4; No LE edema    GBS cx collected  SSE- os appears FT    ATU sono- A->anterior placenta; MVP-4.03; 999g; vtx                  B-> posterior placenta; transverse with head to maternal Left; MVP-3.52; 1043g    NST reactive for gestation x 2; irritability on toco ICU Vital Signs Last 24 Hrs  T(C): 37.2 (17 Oct 2023 14:54), Max: 37.2 (17 Oct 2023 14:54)  T(F): 98.96 (17 Oct 2023 14:54), Max: 98.96 (17 Oct 2023 14:54)  HR: 86 (17 Oct 2023 15:01) (86 - 86)  BP: 128/64 (17 Oct 2023 15:01) (128/64 - 128/64)  BP(mean): --  ABP: --  ABP(mean): --  RR: 16 (17 Oct 2023 14:54) (16 - 16)  SpO2: --    O2 Parameters below as of 17 Oct 2023 14:36  Patient On (Oxygen Delivery Method): room air    Gen: A&O x 3; NAD    Neuro- symmetrical facial features with no slurring of words  Pulm- breathing unlabored  Abd exam- soft and nontender  Extremities- full range of motion x 4; No LE edema    GBS cx collected  SSE- os appears FT  VE-1/50/-3    ATU sono- A->anterior placenta; MVP-4.03; 999g; vtx                  B-> posterior placenta; transverse with head to maternal Left; MVP-3.52; 1043g    NST reactive for gestation x 2; irritability on toco

## 2023-10-17 NOTE — OB PROVIDER TRIAGE NOTE - HISTORY OF PRESENT ILLNESS
34yo female  @ 27.4 wks with di-di TIUP here from the ATU for evaluation of short cervix. Pt with a cervical length of 1.33 cm on 9/21 and was started on PV progesterone, and today the cervix was measured at 0.3 with a deep funnel. Pt reports GFM and denies VB/ LOF. Pt denies ctx's or cramping.    AP course complicated by short cervix
done

## 2023-10-17 NOTE — OB PROVIDER H&P - ATTENDING COMMENTS
34 y/o P1 at 27+ weeks with di-di TIUP sent from ATU with shortened cervix 0.3cm. Previously started on vaginal progesterone for shortened cervix.  Denies LOF, vb, noted to be oleksandr frequently on TOCO but not in pain.  Admitted to ante for mag sulfate, BMZ, indocin and amp. Vertex/transverse presentations.    Kevin Cuadra MD

## 2023-10-17 NOTE — OB PROVIDER H&P - NSHPPHYSICALEXAM_GEN_ALL_CORE
ICU Vital Signs Last 24 Hrs  T(C): 37.2 (17 Oct 2023 14:54), Max: 37.2 (17 Oct 2023 14:54)  T(F): 98.96 (17 Oct 2023 14:54), Max: 98.96 (17 Oct 2023 14:54)  HR: 86 (17 Oct 2023 15:01) (86 - 86)  BP: 128/64 (17 Oct 2023 15:01) (128/64 - 128/64)  BP(mean): --  ABP: --  ABP(mean): --  RR: 16 (17 Oct 2023 14:54) (16 - 16)  SpO2: --    O2 Parameters below as of 17 Oct 2023 14:36  Patient On (Oxygen Delivery Method): room air    Gen: A&O x 3; NAD    Neuro- symmetrical facial features with no slurring of words  Pulm- breathing unlabored  Abd exam- soft and nontender  Extremities- full range of motion x 4; No LE edema    GBS cx collected  SSE- os appears FT  VE-1/50/-3    ATU sono- A->anterior placenta; MVP-4.03; 999g; vtx                  B-> posterior placenta; transverse with head to maternal Left; MVP-3.52; 1043g    NST reactive for gestation x 2; irritability on toco

## 2023-10-17 NOTE — OB RN PATIENT PROFILE - NS PRO TDAP RECEIVED Y/N
Lightheadedness   WHAT YOU NEED TO KNOW:   Lightheadedness is the feeling that you may faint, but you do not  Your heartbeat may be fast or feel like it flutters  Lightheadedness may occur when you take certain medicines, such as medicine to lower your blood pressure  Dehydration, low sodium, low blood sugar, an abnormal heart rhythm, and anxiety are other common causes  DISCHARGE INSTRUCTIONS:   Return to the emergency department if:   · You have sudden chest pain  · You have trouble breathing or shortness of breath  · You have vision changes, are sweating, and have nausea while you are sitting or lying down  · You feel flushed and your heart is fluttering  · You faint  Contact your healthcare provider if:   · You feel lightheaded often  · Your heart beats faster or slower than usual      · You have questions or concerns about your condition or care  Follow up with your healthcare provider as directed: You may need more tests to help find the cause of your lightheadedness  The tests will help healthcare providers plan the best treatment for you  Write down your questions so you remember to ask them during your visits  Self-care:  Talk with your healthcare provider about these and other ways to manage your symptoms:  · Lie down  when you feel lightheaded, your throat gets tight, or your vision changes  Raise your legs above the level of your heart  · Stand up slowly  Sit on the side of the bed or couch for a few minutes before you stand up  · Take slow, deep breaths when you feel lightheaded  This can help decrease the feeling that you might faint  · Ask if you need to avoid hot baths and saunas  These may make your symptoms worse  Watch for signs of low blood sugar: These include hunger, nervousness, sweating, and fast or fluttery heartbeats  Talk with your healthcare provider about ways to keep your blood sugar level steady    Check your blood pressure often:  You should do this especially if you take medicine to lower your blood pressure  Check your blood pressure when you are lying down and when you are standing  Ask how often to check during the day  Keep a record of your blood pressure numbers  Your healthcare provider may use the record to help plan your treatment  Keep a record of your lightheadedness episodes:  Include your symptoms and your activity before and after the episode  The record can help your healthcare provider find the cause of your lightheadedness and help you manage episodes  © 2017 [employer] [employer] Information is for End User's use only and may not be sold, redistributed or otherwise used for commercial purposes  All illustrations and images included in CareNotes® are the copyrighted property of [employer]  or [address]  The above information is an  only  It is not intended as medical advice for individual conditions or treatments  Talk to your doctor, nurse or pharmacist before following any medical regimen to see if it is safe and effective for you  yes...

## 2023-10-17 NOTE — OB RN TRIAGE NOTE - CURRENT PREGNANCY COMPLICATIONS, OB PROFILE
short cervix/Multiple Gestation short cervix dx 9/14/Incompetent Cervix/Cervical Insufficiency/Multiple Gestation

## 2023-10-18 ENCOUNTER — TRANSCRIPTION ENCOUNTER (OUTPATIENT)
Age: 33
End: 2023-10-18

## 2023-10-18 DIAGNOSIS — O60.00 PRETERM LABOR WITHOUT DELIVERY, UNSPECIFIED TRIMESTER: ICD-10-CM

## 2023-10-18 LAB
24R-OH-CALCIDIOL SERPL-MCNC: 51.6 NG/ML — SIGNIFICANT CHANGE UP (ref 30–80)
24R-OH-CALCIDIOL SERPL-MCNC: 51.6 NG/ML — SIGNIFICANT CHANGE UP (ref 30–80)
BASOPHILS # BLD AUTO: 0.01 K/UL — SIGNIFICANT CHANGE UP (ref 0–0.2)
BASOPHILS # BLD AUTO: 0.01 K/UL — SIGNIFICANT CHANGE UP (ref 0–0.2)
BASOPHILS NFR BLD AUTO: 0.1 % — SIGNIFICANT CHANGE UP (ref 0–2)
BASOPHILS NFR BLD AUTO: 0.1 % — SIGNIFICANT CHANGE UP (ref 0–2)
CULTURE RESULTS: SIGNIFICANT CHANGE UP
CULTURE RESULTS: SIGNIFICANT CHANGE UP
EOSINOPHIL # BLD AUTO: 0 K/UL — SIGNIFICANT CHANGE UP (ref 0–0.5)
EOSINOPHIL # BLD AUTO: 0 K/UL — SIGNIFICANT CHANGE UP (ref 0–0.5)
EOSINOPHIL NFR BLD AUTO: 0 % — SIGNIFICANT CHANGE UP (ref 0–6)
EOSINOPHIL NFR BLD AUTO: 0 % — SIGNIFICANT CHANGE UP (ref 0–6)
HCT VFR BLD CALC: 34.6 % — SIGNIFICANT CHANGE UP (ref 34.5–45)
HCT VFR BLD CALC: 34.6 % — SIGNIFICANT CHANGE UP (ref 34.5–45)
HGB BLD-MCNC: 12.1 G/DL — SIGNIFICANT CHANGE UP (ref 11.5–15.5)
HGB BLD-MCNC: 12.1 G/DL — SIGNIFICANT CHANGE UP (ref 11.5–15.5)
IANC: 12.24 K/UL — HIGH (ref 1.8–7.4)
IANC: 12.24 K/UL — HIGH (ref 1.8–7.4)
IMM GRANULOCYTES NFR BLD AUTO: 0.5 % — SIGNIFICANT CHANGE UP (ref 0–0.9)
IMM GRANULOCYTES NFR BLD AUTO: 0.5 % — SIGNIFICANT CHANGE UP (ref 0–0.9)
LYMPHOCYTES # BLD AUTO: 1.75 K/UL — SIGNIFICANT CHANGE UP (ref 1–3.3)
LYMPHOCYTES # BLD AUTO: 1.75 K/UL — SIGNIFICANT CHANGE UP (ref 1–3.3)
LYMPHOCYTES # BLD AUTO: 11.9 % — LOW (ref 13–44)
LYMPHOCYTES # BLD AUTO: 11.9 % — LOW (ref 13–44)
MAGNESIUM SERPL-MCNC: 4.9 MG/DL — HIGH (ref 1.6–2.6)
MAGNESIUM SERPL-MCNC: 4.9 MG/DL — HIGH (ref 1.6–2.6)
MAGNESIUM SERPL-MCNC: 5.7 MG/DL — HIGH (ref 1.6–2.6)
MAGNESIUM SERPL-MCNC: 5.7 MG/DL — HIGH (ref 1.6–2.6)
MAGNESIUM SERPL-MCNC: 5.9 MG/DL — HIGH (ref 1.6–2.6)
MAGNESIUM SERPL-MCNC: 5.9 MG/DL — HIGH (ref 1.6–2.6)
MCHC RBC-ENTMCNC: 32.3 PG — SIGNIFICANT CHANGE UP (ref 27–34)
MCHC RBC-ENTMCNC: 32.3 PG — SIGNIFICANT CHANGE UP (ref 27–34)
MCHC RBC-ENTMCNC: 35 GM/DL — SIGNIFICANT CHANGE UP (ref 32–36)
MCHC RBC-ENTMCNC: 35 GM/DL — SIGNIFICANT CHANGE UP (ref 32–36)
MCV RBC AUTO: 92.3 FL — SIGNIFICANT CHANGE UP (ref 80–100)
MCV RBC AUTO: 92.3 FL — SIGNIFICANT CHANGE UP (ref 80–100)
MONOCYTES # BLD AUTO: 0.64 K/UL — SIGNIFICANT CHANGE UP (ref 0–0.9)
MONOCYTES # BLD AUTO: 0.64 K/UL — SIGNIFICANT CHANGE UP (ref 0–0.9)
MONOCYTES NFR BLD AUTO: 4.3 % — SIGNIFICANT CHANGE UP (ref 2–14)
MONOCYTES NFR BLD AUTO: 4.3 % — SIGNIFICANT CHANGE UP (ref 2–14)
NEUTROPHILS # BLD AUTO: 12.24 K/UL — HIGH (ref 1.8–7.4)
NEUTROPHILS # BLD AUTO: 12.24 K/UL — HIGH (ref 1.8–7.4)
NEUTROPHILS NFR BLD AUTO: 83.2 % — HIGH (ref 43–77)
NEUTROPHILS NFR BLD AUTO: 83.2 % — HIGH (ref 43–77)
NRBC # BLD: 0 /100 WBCS — SIGNIFICANT CHANGE UP (ref 0–0)
NRBC # BLD: 0 /100 WBCS — SIGNIFICANT CHANGE UP (ref 0–0)
NRBC # FLD: 0 K/UL — SIGNIFICANT CHANGE UP (ref 0–0)
NRBC # FLD: 0 K/UL — SIGNIFICANT CHANGE UP (ref 0–0)
PLATELET # BLD AUTO: 307 K/UL — SIGNIFICANT CHANGE UP (ref 150–400)
PLATELET # BLD AUTO: 307 K/UL — SIGNIFICANT CHANGE UP (ref 150–400)
RBC # BLD: 3.75 M/UL — LOW (ref 3.8–5.2)
RBC # BLD: 3.75 M/UL — LOW (ref 3.8–5.2)
RBC # FLD: 13.2 % — SIGNIFICANT CHANGE UP (ref 10.3–14.5)
RBC # FLD: 13.2 % — SIGNIFICANT CHANGE UP (ref 10.3–14.5)
SPECIMEN SOURCE: SIGNIFICANT CHANGE UP
SPECIMEN SOURCE: SIGNIFICANT CHANGE UP
T PALLIDUM AB TITR SER: NEGATIVE — SIGNIFICANT CHANGE UP
T PALLIDUM AB TITR SER: NEGATIVE — SIGNIFICANT CHANGE UP
WBC # BLD: 14.72 K/UL — HIGH (ref 3.8–10.5)
WBC # BLD: 14.72 K/UL — HIGH (ref 3.8–10.5)
WBC # FLD AUTO: 14.72 K/UL — HIGH (ref 3.8–10.5)
WBC # FLD AUTO: 14.72 K/UL — HIGH (ref 3.8–10.5)

## 2023-10-18 RX ORDER — HEPARIN SODIUM 5000 [USP'U]/ML
5000 INJECTION INTRAVENOUS; SUBCUTANEOUS EVERY 12 HOURS
Refills: 0 | Status: DISCONTINUED | OUTPATIENT
Start: 2023-10-19 | End: 2023-10-19

## 2023-10-18 RX ORDER — ALBUTEROL 90 UG/1
2 AEROSOL, METERED ORAL EVERY 6 HOURS
Refills: 0 | Status: DISCONTINUED | OUTPATIENT
Start: 2023-10-18 | End: 2023-10-19

## 2023-10-18 RX ADMIN — Medication 108 GRAM(S): at 05:11

## 2023-10-18 RX ADMIN — SODIUM CHLORIDE 75 MILLILITER(S): 9 INJECTION, SOLUTION INTRAVENOUS at 07:16

## 2023-10-18 RX ADMIN — Medication 108 GRAM(S): at 09:06

## 2023-10-18 RX ADMIN — Medication 108 GRAM(S): at 13:14

## 2023-10-18 RX ADMIN — Medication 25 MILLIGRAM(S): at 17:14

## 2023-10-18 RX ADMIN — CHLORHEXIDINE GLUCONATE 1 APPLICATION(S): 213 SOLUTION TOPICAL at 05:25

## 2023-10-18 RX ADMIN — Medication 25 MILLIGRAM(S): at 23:35

## 2023-10-18 RX ADMIN — Medication 50 GM/HR: at 07:16

## 2023-10-18 RX ADMIN — Medication 12 MILLIGRAM(S): at 17:14

## 2023-10-18 RX ADMIN — Medication 25 MILLIGRAM(S): at 23:05

## 2023-10-18 RX ADMIN — Medication 25 MILLIGRAM(S): at 05:00

## 2023-10-18 RX ADMIN — Medication 108 GRAM(S): at 01:18

## 2023-10-18 RX ADMIN — Medication 25 MILLIGRAM(S): at 11:02

## 2023-10-18 NOTE — CONSULT NOTE PEDS - SUBJECTIVE AND OBJECTIVE BOX
Ms. Streeter is a 34 y/o  admitted at 27w4d gestational age. Maternal history notable for Asthma, and prenatal history notable for short cervix. Most recent EFW 999g for Twin A and EFW 1043g for Twin B on 10/17. NICU consulted to discuss what to expect if she were to deliver at 27 weeks gestation.    I met with Ms. Streeter and we reviewed the followin. The NICU team will be present at her delivery and will immediately assess and care for her infant.    2. Due to lung immaturity, the infant will likely require respiratory support. This can be in the form of CPAP or intubation with mechanical ventilation. The outcomes improve after  steroids.    3. Depending on the clinical status of the infant, enteral feedings may not be started immediately. IV nutrition in the form of TPN would be provided via umbilical line or PICC until the infant is able to tolerate full enteral feedings. Due to immature suck/swallow, the infant will require an orogastric or nasogastric tube once feeds are initiated. There will be a slow transition to enteral feedings. The infant is also at risk for hypoglycemia.    4. Discussed the benefits of breastfeeding in  infants and encouraged mother to pump following delivery.    5. Due to prematurity, the infant will be at increased risk for infection and would likely be started on antibiotics after birth. The infant will be screened for infections following delivery and may require other courses of antibiotics during their hospital course if an infection were suspected. If the infant shows signs and symptoms of feeding intolerance, feedings may be held, and the infant may require evaluation for intestinal infection (necrotizing enterocolitis).    6. Risk of symptomatic PDA discussed with possible need for medical vs procedural closure.    7. Risk of intraventricular hemorrhage (IVH) and possible consequences discussed.    8. Retinopathy of prematurity (ROP) risk discussed along with close follow up with ophthalmologist. If ROP is significant, medical or surgical treatment may be required.    9. The infant is at risk for developmental delays as a consequence of prematurity. The infant will be evaluated by a developmental pediatrician to monitor for neurodevelopmental delays.    10. Thermoregulation issues and need to be in an isolette with slow weaning to a crib discussed.    11. Elevated risk of jaundice and possible requirement for phototherapy discussed.    12. Length of stay is highly variable, but given the infant’s gestational age, average stay is approximately 10 weeks. Discussed discharged criteria.    Ms. Streeter had the chance to ask any questions and was encouraged to contact the NICU at that time if additional questions arise.    Thank you for the opportunity to participate in the care of this patient and please inform us of any changes in her status.

## 2023-10-18 NOTE — DISCHARGE NOTE ANTEPARTUM - MEDICATION SUMMARY - MEDICATIONS TO TAKE
I will START or STAY ON the medications listed below when I get home from the hospital:    aspirin 81 mg oral capsule  -- 1 cap(s) by mouth once a day  -- Indication: For Home med    Prenatal 1 oral capsule  -- 1 tab(s) by mouth once a day  -- Indication: For Home med    progesterone 200 mg vaginal suppository  -- 1 suppository(ies) intravaginally once a day (at bedtime)  -- Indication: For Home med

## 2023-10-18 NOTE — PROGRESS NOTE ADULT - ASSESSMENT
32yo female  @ 27w5d wks with di-di TIUP here with short cervix of 0.3 cm with deep funnel admitted for  labor. Contractions responsive to indocin.  Maternal and fetal status reassuring overnight.     #Shorted cervix   - Previously measured 1.33 1.33 on 23  - CL of 0.3 with marked funneling on 10/17/23     #PTL   - BMZ #2 due at 5pm today   - Continue with Indocin for tocolysis. No evidence of c/x this AM.   - C/W Magnesium for fetal neuroprotection   - C/W Ampicillin 1g q4. F/U GBS (10/17)     #Di/Di twin pregnancy   -Fetal presentation: Vtx/transverse.   -Appropriately grown A- 999g (16%) B- 1043 (25%)     Nancie Man, pgy-3

## 2023-10-18 NOTE — DISCHARGE NOTE ANTEPARTUM - PLAN OF CARE
- Follow up with OB Clinic at [facility] on Monday 10/23/23  - Return with contractions, vaginal bleeding, leaking fluid or decreased fetal movement - Follow up with OB Clinic at [facility] on Monday 10/23/23 at 7am   - Return with contractions, vaginal bleeding, leaking fluid or decreased fetal movement

## 2023-10-18 NOTE — DISCHARGE NOTE ANTEPARTUM - CARE PLAN
1 Principal Discharge DX:	Threatened  labor  Assessment and plan of treatment:	- Follow up with OB Clinic at [facility] on Monday 10/23/23  - Return with contractions, vaginal bleeding, leaking fluid or decreased fetal movement   Principal Discharge DX:	Threatened  labor  Assessment and plan of treatment:	- Follow up with OB Clinic at [facility] on Monday 10/23/23 at 7am   - Return with contractions, vaginal bleeding, leaking fluid or decreased fetal movement

## 2023-10-18 NOTE — DISCHARGE NOTE ANTEPARTUM - CARE PROVIDER_API CALL
[facility],   [address]  [address] [place] [facility] of oncology [facility]  Phone: [phone]  Fax: (   )    -  Follow Up Time:

## 2023-10-18 NOTE — DISCHARGE NOTE ANTEPARTUM - ADDITIONAL INSTRUCTIONS
Follow up with OB clinic at [facility] on Monday 10/23/23 Follow up with OB clinic at [facility] on Monday 10/23/23 7 am

## 2023-10-18 NOTE — DISCHARGE NOTE ANTEPARTUM - PATIENT PORTAL LINK FT
You can access the FollowMyHealth Patient Portal offered by [facility] by registering at the following website: [email or web address]. By joining [facility]’s FollowMyHealth portal, you will also be able to view your health information using other applications (apps) compatible with our system.

## 2023-10-18 NOTE — DISCHARGE NOTE ANTEPARTUM - PROVIDER TOKENS
FREE:[LAST:[[facility]],PHONE:[phone]],FAX:[phone]   [phone]    -],ADDRESS:[[phone] [place] [address] oncology [facility]]]

## 2023-10-18 NOTE — DISCHARGE NOTE ANTEPARTUM - HOSPITAL COURSE
34yo female  @ 27w5d wks with di-di TIUP here with short cervix of 0.3 cm with deep funnel admitted for  labor. Contractions responsive to indocin.  s/p BMZ 10/17-10/18. Continue with Indocin for tocolysis. No evidence of c/x this AM. Pt recieved Magnesium for fetal neuroprotection and Ampicillin for GBS ppx.  Sonogram showing WNL EFW: A- 999g (16%) B- 1043 (25%).

## 2023-10-18 NOTE — DISCHARGE NOTE ANTEPARTUM - NS MD DC FALL RISK RISK
For information on Fall & Injury Prevention, visit: [email or web address] OR  [email or web address] OR  https://www.cdc.gov/steadi/patient.html

## 2023-10-19 VITALS
DIASTOLIC BLOOD PRESSURE: 68 MMHG | OXYGEN SATURATION: 98 % | RESPIRATION RATE: 16 BRPM | TEMPERATURE: 99 F | HEART RATE: 86 BPM | SYSTOLIC BLOOD PRESSURE: 111 MMHG

## 2023-10-19 PROBLEM — J45.909 UNSPECIFIED ASTHMA, UNCOMPLICATED: Chronic | Status: ACTIVE | Noted: 2023-10-17

## 2023-10-19 LAB
CULTURE RESULTS: SIGNIFICANT CHANGE UP
CULTURE RESULTS: SIGNIFICANT CHANGE UP
HCT VFR BLD CALC: 35.6 % — SIGNIFICANT CHANGE UP (ref 34.5–45)
HCT VFR BLD CALC: 35.6 % — SIGNIFICANT CHANGE UP (ref 34.5–45)
HGB BLD-MCNC: 12.1 G/DL — SIGNIFICANT CHANGE UP (ref 11.5–15.5)
HGB BLD-MCNC: 12.1 G/DL — SIGNIFICANT CHANGE UP (ref 11.5–15.5)
MCHC RBC-ENTMCNC: 32.2 PG — SIGNIFICANT CHANGE UP (ref 27–34)
MCHC RBC-ENTMCNC: 32.2 PG — SIGNIFICANT CHANGE UP (ref 27–34)
MCHC RBC-ENTMCNC: 34 GM/DL — SIGNIFICANT CHANGE UP (ref 32–36)
MCHC RBC-ENTMCNC: 34 GM/DL — SIGNIFICANT CHANGE UP (ref 32–36)
MCV RBC AUTO: 94.7 FL — SIGNIFICANT CHANGE UP (ref 80–100)
MCV RBC AUTO: 94.7 FL — SIGNIFICANT CHANGE UP (ref 80–100)
NRBC # BLD: 0 /100 WBCS — SIGNIFICANT CHANGE UP (ref 0–0)
NRBC # BLD: 0 /100 WBCS — SIGNIFICANT CHANGE UP (ref 0–0)
NRBC # FLD: 0 K/UL — SIGNIFICANT CHANGE UP (ref 0–0)
NRBC # FLD: 0 K/UL — SIGNIFICANT CHANGE UP (ref 0–0)
PLATELET # BLD AUTO: 290 K/UL — SIGNIFICANT CHANGE UP (ref 150–400)
PLATELET # BLD AUTO: 290 K/UL — SIGNIFICANT CHANGE UP (ref 150–400)
RBC # BLD: 3.76 M/UL — LOW (ref 3.8–5.2)
RBC # BLD: 3.76 M/UL — LOW (ref 3.8–5.2)
RBC # FLD: 13.3 % — SIGNIFICANT CHANGE UP (ref 10.3–14.5)
RBC # FLD: 13.3 % — SIGNIFICANT CHANGE UP (ref 10.3–14.5)
SPECIMEN SOURCE: SIGNIFICANT CHANGE UP
SPECIMEN SOURCE: SIGNIFICANT CHANGE UP
T PALLIDUM AB TITR SER: NEGATIVE — SIGNIFICANT CHANGE UP
T PALLIDUM AB TITR SER: NEGATIVE — SIGNIFICANT CHANGE UP
WBC # BLD: 14.29 K/UL — HIGH (ref 3.8–10.5)
WBC # BLD: 14.29 K/UL — HIGH (ref 3.8–10.5)
WBC # FLD AUTO: 14.29 K/UL — HIGH (ref 3.8–10.5)
WBC # FLD AUTO: 14.29 K/UL — HIGH (ref 3.8–10.5)

## 2023-10-19 PROCEDURE — 99233 SBSQ HOSP IP/OBS HIGH 50: CPT | Mod: 25

## 2023-10-19 RX ADMIN — Medication 25 MILLIGRAM(S): at 05:20

## 2023-10-19 RX ADMIN — CHLORHEXIDINE GLUCONATE 1 APPLICATION(S): 213 SOLUTION TOPICAL at 09:56

## 2023-10-19 RX ADMIN — Medication 25 MILLIGRAM(S): at 05:50

## 2023-10-19 RX ADMIN — HEPARIN SODIUM 5000 UNIT(S): 5000 INJECTION INTRAVENOUS; SUBCUTANEOUS at 05:20

## 2023-10-19 NOTE — PROGRESS NOTE ADULT - ATTENDING COMMENTS
Patient seen at bedside morning rounds.    Fetal tracing reviewed today noted to be reactive for both fetuses    Patient has a diamniontic dichorionic twin gestation with both twins AGA  Patient presented with contractions and finding of cervical shortening  Patient has been on vaginal progesterone in this pregnancy  Patient completed betamethasone 10/17-18, s/p magnesium sulfate  Patient completed indomethicine course today  Patient will be monitored today and if remains stable will have pelvic exam afternoon to evaluate for possible discharge  If patient remains within cervical dilation then will be candidate for outpatient management with planned close follow up with the clinic on Monday.  Patient aware to continue pelvic rest and modified light activity.

## 2023-10-19 NOTE — CHART NOTE - NSCHARTNOTEFT_GEN_A_CORE
Patient seen and examined. She denies feeling contractions. Denies vaginal bleeding, leaking fluid. Endorses +FMx2.     PE:  Vital Signs Last 24 Hrs  T(C): 37.1 (19 Oct 2023 13:38), Max: 37.2 (18 Oct 2023 22:19)  T(F): 98.7 (19 Oct 2023 13:38), Max: 98.9 (18 Oct 2023 22:19)  HR: 86 (19 Oct 2023 13:38) (74 - 100)  BP: 111/68 (19 Oct 2023 13:38) (98/54 - 115/62)  BP(mean): --  RR: 16 (19 Oct 2023 13:38) (16 - 18)  SpO2: 98% (19 Oct 2023 13:38) (95% - 98%)    Parameters below as of 19 Oct 2023 13:38  Patient On (Oxygen Delivery Method): room air    NST reactive x2  [place]: no contractions  VE: 1/70/-3    Plan:  - Discharge home with strict PTL precautions. To return to clinic on Monday at 7am for fetal testing at [facility] apt.   - continue vaginal progesterone.     d/w Dr. Phoenix Wyatt NP

## 2023-10-19 NOTE — PROGRESS NOTE ADULT - SUBJECTIVE AND OBJECTIVE BOX
Patient seen and examined at bedside, no acute overnight events. No acute complaints. Patient endorses good fetal movement. Patient is ambulating and tolerating regular diet. Denies CP, SOB, N/V, fevers, chills, or any other concerns. No abdominal tightening since yesterday afternoon. Denies vaginal bleeding.     Vital Signs Last 24 Hours  T(C): 37.1 (10-19-23 @ 01:29), Max: 37.2 (10-18-23 @ 22:19)  HR: 74 (10-19-23 @ 05:23) (74 - 100)  BP: 100/57 (10-19-23 @ 05:23) (98/54 - 128/68)  RR: 17 (10-19-23 @ 01:29) (16 - 18)  SpO2: 95% (10-19-23 @ 01:29) (94% - 100%)    I&O's Summary    18 Oct 2023 07:01  -  19 Oct 2023 07:00  --------------------------------------------------------  IN: 675 mL / OUT: 1300 mL / NET: -625 mL        Physical Exam:  General: NAD  CV: RR  Lungs: breathing comfortably on RA  Abdomen: soft, gravid, non-tender  Ext: no pain or swelling    Labs:             12.1   14.29<H> )-----------( 290      ( 10-19 @ 05:27 )             35.6                12.1   14.72<H> )-----------( 307      ( 10-18 @ 11:00 )             34.6                13.5   14.66<H> )-----------( 327      ( 10-17 @ 16:50 )             39.7                12.9   12.06<H> )-----------( 286      ( 10-17 @ 12:04 )             38.2         MEDICATIONS  (STANDING):  chlorhexidine 2% Cloths 1 Application(s) Topical daily  heparin   Injectable 5000 Unit(s) SubCutaneous every 12 hours  indomethacin 25 milliGRAM(s) Oral every 6 hours  influenza   Vaccine 0.5 milliLiter(s) IntraMuscular once    MEDICATIONS  (PRN):  albuterol    90 MICROgram(s) HFA Inhaler 2 Puff(s) Inhalation every 6 hours PRN Bronchospasm  
MFM Fellow Consult Note      HPI: 33 year old  at 27w4d with gracia twin pregnancy is currently undergoing evaluation in triage for new finding of short cervix. Today was found to be measured at 0.3cm w/ funneling. Denies ctx/vb/lof +FM of note known history of short cervix and progesterone was started on .    Histories  OB  G1: SAB D&C  G2: ETOP  G3: FT     GYN: None  Meds: PNV, progesterone  Medical: none  Surgical: D&C  Allergies: NKDA    Physical exam  ICU Vital Signs Last 24 Hrs  T(C): 37.2 (17 Oct 2023 14:54), Max: 37.2 (17 Oct 2023 14:36)  T(F): 98.96 (17 Oct 2023 14:54), Max: 99 (17 Oct 2023 14:36)  HR: 91 (17 Oct 2023 15:42) (86 - 91)  BP: 122/73 (17 Oct 2023 15:42) (109/67 - 128/64)  BP(mean): --  ABP: --  ABP(mean): --  RR: 16 (17 Oct 2023 14:54) (16 - 17)  SpO2: --  gen: well appearing  
Patient is a 33y old  Female who presents with a chief complaint of r/o PTL (18 Oct 2023 08:15)      HPI:  32yo female  @ 27.4 wks with di-di TIUP here from the ATU for evaluation of short cervix. Pt with a cervical length of 1.33 cm on  and was started on PV progesterone, and today the cervix was measured at 0.3 with a deep funnel. Pt reports GFM and denies VB/ LOF. Pt denies ctx's or cramping.    AP course complicated by short cervix and was started on PV progesterone  (17 Oct 2023 15:47)      PAST MEDICAL & SURGICAL HISTORY:  Asthma in child      Termination of pregnancy (fetus)      History of dilation and curettage          MEDICATIONS  (STANDING):  ampicillin  IVPB 1 Gram(s) IV Intermittent every 4 hours  betamethasone Injectable 12 milliGRAM(s) IntraMuscular every 24 hours  chlorhexidine 2% Cloths 1 Application(s) Topical daily  indomethacin 25 milliGRAM(s) Oral every 6 hours  influenza   Vaccine 0.5 milliLiter(s) IntraMuscular once      FAMILY HISTORY:      Allergies    No Known Allergies    Intolerances        REVIEW OF SYSTEMS      General:	    Skin/Breast:  	  Ophthalmologic:  	  ENMT:	    Respiratory and Thorax:  	  Cardiovascular:	    Gastrointestinal:	    Genitourinary:	    Musculoskeletal:	    Neurological:	    Psychiatric:	    Hematology/Lymphatics:	    Endocrine:	    Allergic/Immunologic:	    Vital Signs Last 24 Hrs  T(C): 36.8 (18 Oct 2023 12:09), Max: 37.2 (17 Oct 2023 14:36)  T(F): 98.24 (18 Oct 2023 12:09), Max: 99 (17 Oct 2023 14:36)  HR: 83 (18 Oct 2023 12:09) (72 - 108)  BP: 110/58 (18 Oct 2023 12:09) (97/54 - 148/72)  BP(mean): --  RR: 16 (18 Oct 2023 12:09) (16 - 18)  SpO2: 100% (18 Oct 2023 12:07) (93% - 100%)    Parameters below as of 17 Oct 2023 16:41  Patient On (Oxygen Delivery Method): room air        PHYSICAL EXAM:  No contractions pain or bleeding  Patient is on magnesium sulphate and  indocin  Will discontinue magnesium sulphate and allow ambulation and diet and continue to observe today  If she remains contractions free tomorrow consider discharge home        17 Oct 2023 07:01  -  18 Oct 2023 07:00  --------------------------------------------------------  IN: 1950 mL / OUT: 4100 mL / NET: -2150 mL    18 Oct 2023 07:01  -  18 Oct 2023 13:33  --------------------------------------------------------  IN: 675 mL / OUT: 1300 mL / NET: -625 mL        LABORATORY:                        12.1   14.72 )-----------( 307      ( 18 Oct 2023 11:00 )             34.6       Mg     5.90     10-18        Urinalysis Basic - ( 17 Oct 2023 16:25 )    Color: Yellow / Appearance: Clear / S.009 / pH: x  Gluc: x / Ketone: Negative mg/dL  / Bili: Negative / Urobili: 0.2 mg/dL   Blood: x / Protein: Negative mg/dL / Nitrite: Negative   Leuk Esterase: Negative / RBC: x / WBC x   Sq Epi: x / Non Sq Epi: x / Bacteria: x    
Patient seen and examined at bedside, no acute overnight events. No acute complaints. Abdominal pain resolved.  Denies vaginal bleeding or pelvic pressure.  Pt reports +FM. Remains NPO. Denies nausea. Ambulating without difficulty and voiding spontaneously    Vital Signs Last 24 Hours  T(C): 36.7 (10-18-23 @ 04:57), Max: 37.2 (10-17-23 @ 14:36)  HR: 81 (10-18-23 @ 06:53) (72 - 108)  BP: 106/62 (10-18-23 @ 06:41) (97/54 - 148/72)  RR: 18 (10-18-23 @ 04:57) (16 - 18)  SpO2: 96% (10-18-23 @ 06:53) (93% - 100%)    CAPILLARY BLOOD GLUCOSE    Physical Exam:  General: NAD  Abdomen: Soft, non-tender, gravid  No vaginal bleeding on the pad  Ext: No pain or swelling  Fetal status reassuring overnight.     Labs:             13.5   14.66 )-----------( 327      ( 10-17 @ 16:50 )             39.7       Mg     5.70     10-18 @ 05:00      MEDICATIONS  (STANDING):  ampicillin  IVPB 1 Gram(s) IV Intermittent every 4 hours  betamethasone Injectable 12 milliGRAM(s) IntraMuscular every 24 hours  chlorhexidine 2% Cloths 1 Application(s) Topical daily  indomethacin 25 milliGRAM(s) Oral every 6 hours  influenza   Vaccine 0.5 milliLiter(s) IntraMuscular once  lactated ringers. 1000 milliLiter(s) (75 mL/Hr) IV Continuous <Continuous>  magnesium sulfate Infusion 2 Gm/Hr (50 mL/Hr) IV Continuous <Continuous>  oxytocin Infusion 333.333 milliUNIT(s)/Min (1000 mL/Hr) IV Continuous <Continuous>    MEDICATIONS  (PRN):

## 2023-10-19 NOTE — PROGRESS NOTE ADULT - ASSESSMENT
34yo female  @ 27w6d wks with di-di TIUP here with short cervix of 0.3 cm with deep funnel admitted for  labor. Contractions responsive to indocin.  Maternal and fetal status reassuring overnight.     #Shorted cervix   - Previously measured 1.33 1.33 on 23  - CL of 0.3 with marked funneling on 10/17/23     #PTL   - BMZ #2 due at 5pm today   - Continue with Indocin for tocolysis. No evidence of c/x this AM.   - s/p Magnesium for fetal neuroprotection   - C/W Ampicillin 1g q4. F/U GBS (10/17)     #Di/Di twin pregnancy   -Fetal presentation: Vtx/transverse.   -Appropriately grown A- 999g (16%) B- 1043 (25%)     #Maternal wellbeing   - c/w HSQ for DVT ppx   - Regular diet    Anisa English, PGY3 32yo female  @ 27w6d wks with di-di TIUP here with short cervix of 0.3 cm with deep funnel admitted for  labor. Contractions responsive to indocin.  Maternal and fetal status reassuring overnight.     #Shorted cervix   - Previously measured 1.33 1.33 on 23  - CL of 0.3 with marked funneling on 10/17/23     #PTL   - BMZ #2 due at 5pm today   - Continue with Indocin for tocolysis. No evidence of c/x this AM.   - s/p Magnesium for fetal neuroprotection   - C/W Ampicillin 1g q4. F/U GBS (10/17)     #Di/Di twin pregnancy   -Fetal presentation: Vtx/transverse.   -Appropriately grown A- 999g (16%) B- 1043 (25%)     #Maternal wellbeing   - c/w HSQ for DVT ppx   - Regular diet    Anisa English, PGY3        [facility] Fellow Addendum    32yo female  @ 27w6d wks with di-di TIUP here with short cervix of 0.3 cm with deep funnel admitted for concern for developing  labor. VSS. Asxs. EFM reassuring x2. s/p BMTZ and mg gtt. Plan for repeat SVE this afternoon s/p last dose of indocin this morning. If no further ctx and exam stable for d/c home w/ short interval follow up. Precautions reviewed.    Patient seen w/ Dr. Garibay ([facility] attending)    Mikael Yeager M.D. FACOG PGY-7  Maternal Fetal Medicine Fellow  Cell: [phone] if after 5pm or weekend ask labor and delivery for on call fellow

## 2023-10-20 ENCOUNTER — NON-APPOINTMENT (OUTPATIENT)
Age: 33
End: 2023-10-20

## 2023-10-22 ENCOUNTER — NON-APPOINTMENT (OUTPATIENT)
Age: 33
End: 2023-10-22

## 2023-10-23 ENCOUNTER — APPOINTMENT (OUTPATIENT)
Dept: ANTEPARTUM | Facility: HOSPITAL | Age: 33
End: 2023-10-23
Payer: MEDICAID

## 2023-10-23 ENCOUNTER — OUTPATIENT (OUTPATIENT)
Dept: OUTPATIENT SERVICES | Facility: HOSPITAL | Age: 33
LOS: 1 days | End: 2023-10-23

## 2023-10-23 ENCOUNTER — APPOINTMENT (OUTPATIENT)
Dept: ANTEPARTUM | Facility: CLINIC | Age: 33
End: 2023-10-23

## 2023-10-23 ENCOUNTER — NON-APPOINTMENT (OUTPATIENT)
Age: 33
End: 2023-10-23

## 2023-10-23 VITALS
DIASTOLIC BLOOD PRESSURE: 61 MMHG | BODY MASS INDEX: 25.23 KG/M2 | HEIGHT: 66 IN | TEMPERATURE: 98.1 F | WEIGHT: 157 LBS | SYSTOLIC BLOOD PRESSURE: 103 MMHG | HEART RATE: 91 BPM

## 2023-10-23 DIAGNOSIS — Z33.2 ENCOUNTER FOR ELECTIVE TERMINATION OF PREGNANCY: Chronic | ICD-10-CM

## 2023-10-23 PROCEDURE — 99213 OFFICE O/P EST LOW 20 MIN: CPT | Mod: TH,GE,25

## 2023-10-26 DIAGNOSIS — O47.00 FALSE LABOR BEFORE 37 COMPLETED WEEKS OF GESTATION, UNSPECIFIED TRIMESTER: ICD-10-CM

## 2023-10-26 DIAGNOSIS — O26.879 CERVICAL SHORTENING, UNSPECIFIED TRIMESTER: ICD-10-CM

## 2023-10-26 DIAGNOSIS — Z00.00 ENCOUNTER FOR GENERAL ADULT MEDICAL EXAMINATION WITHOUT ABNORMAL FINDINGS: ICD-10-CM

## 2023-10-26 DIAGNOSIS — Z23 ENCOUNTER FOR IMMUNIZATION: ICD-10-CM

## 2023-10-26 DIAGNOSIS — Z34.90 ENCOUNTER FOR SUPERVISION OF NORMAL PREGNANCY, UNSPECIFIED, UNSPECIFIED TRIMESTER: ICD-10-CM

## 2023-10-26 DIAGNOSIS — O30.009 TWIN PREGNANCY, UNSPECIFIED NUMBER OF PLACENTA AND UNSPECIFIED NUMBER OF AMNIOTIC SACS, UNSPECIFIED TRIMESTER: ICD-10-CM

## 2023-10-31 ENCOUNTER — APPOINTMENT (OUTPATIENT)
Dept: OBGYN | Facility: HOSPITAL | Age: 33
End: 2023-10-31

## 2023-11-06 ENCOUNTER — APPOINTMENT (OUTPATIENT)
Dept: ANTEPARTUM | Facility: HOSPITAL | Age: 33
End: 2023-11-06
Payer: MEDICAID

## 2023-11-06 ENCOUNTER — OUTPATIENT (OUTPATIENT)
Dept: OUTPATIENT SERVICES | Facility: HOSPITAL | Age: 33
LOS: 1 days | End: 2023-11-06

## 2023-11-06 VITALS
SYSTOLIC BLOOD PRESSURE: 112 MMHG | BODY MASS INDEX: 26.2 KG/M2 | DIASTOLIC BLOOD PRESSURE: 70 MMHG | TEMPERATURE: 98.1 F | HEIGHT: 66 IN | WEIGHT: 163 LBS | HEART RATE: 93 BPM

## 2023-11-06 DIAGNOSIS — Z98.890 OTHER SPECIFIED POSTPROCEDURAL STATES: Chronic | ICD-10-CM

## 2023-11-06 PROCEDURE — 99213 OFFICE O/P EST LOW 20 MIN: CPT | Mod: TH,25,GE

## 2023-11-07 DIAGNOSIS — O09.93 SUPERVISION OF HIGH RISK PREGNANCY, UNSPECIFIED, THIRD TRIMESTER: ICD-10-CM

## 2023-11-07 DIAGNOSIS — O30.043 TWIN PREGNANCY, DICHORIONIC/DIAMNIOTIC, THIRD TRIMESTER: ICD-10-CM

## 2023-11-07 DIAGNOSIS — O26.879 CERVICAL SHORTENING, UNSPECIFIED TRIMESTER: ICD-10-CM

## 2023-11-14 ENCOUNTER — APPOINTMENT (OUTPATIENT)
Dept: MATERNAL FETAL MEDICINE | Facility: HOSPITAL | Age: 33
End: 2023-11-14

## 2023-11-20 ENCOUNTER — APPOINTMENT (OUTPATIENT)
Dept: MATERNAL FETAL MEDICINE | Facility: HOSPITAL | Age: 33
End: 2023-11-20
Payer: MEDICAID

## 2023-11-20 ENCOUNTER — OUTPATIENT (OUTPATIENT)
Dept: OUTPATIENT SERVICES | Facility: HOSPITAL | Age: 33
LOS: 1 days | End: 2023-11-20

## 2023-11-20 ENCOUNTER — APPOINTMENT (OUTPATIENT)
Dept: ANTEPARTUM | Facility: HOSPITAL | Age: 33
End: 2023-11-20
Payer: MEDICAID

## 2023-11-20 ENCOUNTER — ASOB RESULT (OUTPATIENT)
Age: 33
End: 2023-11-20

## 2023-11-20 VITALS
WEIGHT: 168.2 LBS | HEIGHT: 66 IN | BODY MASS INDEX: 27.03 KG/M2 | HEART RATE: 86 BPM | DIASTOLIC BLOOD PRESSURE: 69 MMHG | TEMPERATURE: 97.6 F | SYSTOLIC BLOOD PRESSURE: 112 MMHG

## 2023-11-20 DIAGNOSIS — Z33.2 ENCOUNTER FOR ELECTIVE TERMINATION OF PREGNANCY: Chronic | ICD-10-CM

## 2023-11-20 PROCEDURE — 76819 FETAL BIOPHYS PROFIL W/O NST: CPT | Mod: 26,59

## 2023-11-20 PROCEDURE — 76816 OB US FOLLOW-UP PER FETUS: CPT | Mod: 26

## 2023-11-20 PROCEDURE — 76816 OB US FOLLOW-UP PER FETUS: CPT | Mod: 26,59

## 2023-11-20 RX ORDER — PROGESTERONE 200 MG/1
200 CAPSULE ORAL
Qty: 60 | Refills: 2 | Status: ACTIVE | COMMUNITY
Start: 2023-09-07 | End: 1900-01-01

## 2023-11-20 RX ORDER — ASPIRIN 81 MG/1
81 TABLET, CHEWABLE ORAL DAILY
Qty: 60 | Refills: 2 | Status: ACTIVE | COMMUNITY
Start: 2023-11-20 | End: 1900-01-01

## 2023-11-20 RX ORDER — ASPIRIN 81 MG/1
81 TABLET, CHEWABLE ORAL DAILY
Qty: 30 | Refills: 5 | Status: DISCONTINUED | COMMUNITY
Start: 2023-11-20 | End: 2023-11-20

## 2023-11-21 DIAGNOSIS — O30.049 TWIN PREGNANCY, DICHORIONIC/DIAMNIOTIC, UNSPECIFIED TRIMESTER: ICD-10-CM

## 2023-11-21 DIAGNOSIS — O26.879 CERVICAL SHORTENING, UNSPECIFIED TRIMESTER: ICD-10-CM

## 2023-11-21 DIAGNOSIS — O09.93 SUPERVISION OF HIGH RISK PREGNANCY, UNSPECIFIED, THIRD TRIMESTER: ICD-10-CM

## 2023-12-04 ENCOUNTER — RESULT REVIEW (OUTPATIENT)
Age: 33
End: 2023-12-04

## 2023-12-04 ENCOUNTER — OUTPATIENT (OUTPATIENT)
Dept: OUTPATIENT SERVICES | Facility: HOSPITAL | Age: 33
LOS: 1 days | End: 2023-12-04

## 2023-12-04 ENCOUNTER — APPOINTMENT (OUTPATIENT)
Dept: ANTEPARTUM | Facility: HOSPITAL | Age: 33
End: 2023-12-04
Payer: MEDICAID

## 2023-12-04 VITALS
BODY MASS INDEX: 27.97 KG/M2 | HEIGHT: 66 IN | TEMPERATURE: 98.3 F | SYSTOLIC BLOOD PRESSURE: 123 MMHG | HEART RATE: 95 BPM | DIASTOLIC BLOOD PRESSURE: 76 MMHG | WEIGHT: 174 LBS

## 2023-12-04 DIAGNOSIS — Z98.890 OTHER SPECIFIED POSTPROCEDURAL STATES: Chronic | ICD-10-CM

## 2023-12-04 LAB
BASOPHILS # BLD AUTO: 0.03 K/UL — SIGNIFICANT CHANGE UP (ref 0–0.2)
BASOPHILS # BLD AUTO: 0.03 K/UL — SIGNIFICANT CHANGE UP (ref 0–0.2)
BASOPHILS NFR BLD AUTO: 0.3 % — SIGNIFICANT CHANGE UP (ref 0–2)
BASOPHILS NFR BLD AUTO: 0.3 % — SIGNIFICANT CHANGE UP (ref 0–2)
EOSINOPHIL # BLD AUTO: 0.05 K/UL — SIGNIFICANT CHANGE UP (ref 0–0.5)
EOSINOPHIL # BLD AUTO: 0.05 K/UL — SIGNIFICANT CHANGE UP (ref 0–0.5)
EOSINOPHIL NFR BLD AUTO: 0.5 % — SIGNIFICANT CHANGE UP (ref 0–6)
EOSINOPHIL NFR BLD AUTO: 0.5 % — SIGNIFICANT CHANGE UP (ref 0–6)
HCT VFR BLD CALC: 38.7 % — SIGNIFICANT CHANGE UP (ref 34.5–45)
HCT VFR BLD CALC: 38.7 % — SIGNIFICANT CHANGE UP (ref 34.5–45)
HGB BLD-MCNC: 13.2 G/DL — SIGNIFICANT CHANGE UP (ref 11.5–15.5)
HGB BLD-MCNC: 13.2 G/DL — SIGNIFICANT CHANGE UP (ref 11.5–15.5)
HIV 1+2 AB+HIV1 P24 AG SERPL QL IA: SIGNIFICANT CHANGE UP
HIV 1+2 AB+HIV1 P24 AG SERPL QL IA: SIGNIFICANT CHANGE UP
IANC: 6.54 K/UL — SIGNIFICANT CHANGE UP (ref 1.8–7.4)
IANC: 6.54 K/UL — SIGNIFICANT CHANGE UP (ref 1.8–7.4)
IMM GRANULOCYTES NFR BLD AUTO: 0.4 % — SIGNIFICANT CHANGE UP (ref 0–0.9)
IMM GRANULOCYTES NFR BLD AUTO: 0.4 % — SIGNIFICANT CHANGE UP (ref 0–0.9)
LYMPHOCYTES # BLD AUTO: 2.71 K/UL — SIGNIFICANT CHANGE UP (ref 1–3.3)
LYMPHOCYTES # BLD AUTO: 2.71 K/UL — SIGNIFICANT CHANGE UP (ref 1–3.3)
LYMPHOCYTES # BLD AUTO: 26.5 % — SIGNIFICANT CHANGE UP (ref 13–44)
LYMPHOCYTES # BLD AUTO: 26.5 % — SIGNIFICANT CHANGE UP (ref 13–44)
MCHC RBC-ENTMCNC: 32.7 PG — SIGNIFICANT CHANGE UP (ref 27–34)
MCHC RBC-ENTMCNC: 32.7 PG — SIGNIFICANT CHANGE UP (ref 27–34)
MCHC RBC-ENTMCNC: 34.1 GM/DL — SIGNIFICANT CHANGE UP (ref 32–36)
MCHC RBC-ENTMCNC: 34.1 GM/DL — SIGNIFICANT CHANGE UP (ref 32–36)
MCV RBC AUTO: 95.8 FL — SIGNIFICANT CHANGE UP (ref 80–100)
MCV RBC AUTO: 95.8 FL — SIGNIFICANT CHANGE UP (ref 80–100)
MONOCYTES # BLD AUTO: 0.86 K/UL — SIGNIFICANT CHANGE UP (ref 0–0.9)
MONOCYTES # BLD AUTO: 0.86 K/UL — SIGNIFICANT CHANGE UP (ref 0–0.9)
MONOCYTES NFR BLD AUTO: 8.4 % — SIGNIFICANT CHANGE UP (ref 2–14)
MONOCYTES NFR BLD AUTO: 8.4 % — SIGNIFICANT CHANGE UP (ref 2–14)
NEUTROPHILS # BLD AUTO: 6.54 K/UL — SIGNIFICANT CHANGE UP (ref 1.8–7.4)
NEUTROPHILS # BLD AUTO: 6.54 K/UL — SIGNIFICANT CHANGE UP (ref 1.8–7.4)
NEUTROPHILS NFR BLD AUTO: 63.9 % — SIGNIFICANT CHANGE UP (ref 43–77)
NEUTROPHILS NFR BLD AUTO: 63.9 % — SIGNIFICANT CHANGE UP (ref 43–77)
NRBC # BLD: 0 /100 WBCS — SIGNIFICANT CHANGE UP (ref 0–0)
NRBC # BLD: 0 /100 WBCS — SIGNIFICANT CHANGE UP (ref 0–0)
NRBC # FLD: 0 K/UL — SIGNIFICANT CHANGE UP (ref 0–0)
NRBC # FLD: 0 K/UL — SIGNIFICANT CHANGE UP (ref 0–0)
PLATELET # BLD AUTO: 272 K/UL — SIGNIFICANT CHANGE UP (ref 150–400)
PLATELET # BLD AUTO: 272 K/UL — SIGNIFICANT CHANGE UP (ref 150–400)
RBC # BLD: 4.04 M/UL — SIGNIFICANT CHANGE UP (ref 3.8–5.2)
RBC # BLD: 4.04 M/UL — SIGNIFICANT CHANGE UP (ref 3.8–5.2)
RBC # FLD: 13.2 % — SIGNIFICANT CHANGE UP (ref 10.3–14.5)
RBC # FLD: 13.2 % — SIGNIFICANT CHANGE UP (ref 10.3–14.5)
WBC # BLD: 10.23 K/UL — SIGNIFICANT CHANGE UP (ref 3.8–10.5)
WBC # BLD: 10.23 K/UL — SIGNIFICANT CHANGE UP (ref 3.8–10.5)
WBC # FLD AUTO: 10.23 K/UL — SIGNIFICANT CHANGE UP (ref 3.8–10.5)
WBC # FLD AUTO: 10.23 K/UL — SIGNIFICANT CHANGE UP (ref 3.8–10.5)

## 2023-12-04 PROCEDURE — 99213 OFFICE O/P EST LOW 20 MIN: CPT | Mod: TH,GE,25

## 2023-12-04 RX ORDER — PROGESTERONE 200 MG/1
200 CAPSULE ORAL AT BEDTIME
Qty: 30 | Refills: 0 | Status: ACTIVE | COMMUNITY
Start: 2023-11-20

## 2023-12-05 LAB
C TRACH RRNA SPEC QL NAA+PROBE: SIGNIFICANT CHANGE UP
C TRACH RRNA SPEC QL NAA+PROBE: SIGNIFICANT CHANGE UP
N GONORRHOEA RRNA SPEC QL NAA+PROBE: SIGNIFICANT CHANGE UP
N GONORRHOEA RRNA SPEC QL NAA+PROBE: SIGNIFICANT CHANGE UP
SPECIMEN SOURCE: SIGNIFICANT CHANGE UP
SPECIMEN SOURCE: SIGNIFICANT CHANGE UP
T PALLIDUM AB TITR SER: NEGATIVE — SIGNIFICANT CHANGE UP
T PALLIDUM AB TITR SER: NEGATIVE — SIGNIFICANT CHANGE UP

## 2023-12-06 DIAGNOSIS — O09.93 SUPERVISION OF HIGH RISK PREGNANCY, UNSPECIFIED, THIRD TRIMESTER: ICD-10-CM

## 2023-12-06 DIAGNOSIS — O30.043 TWIN PREGNANCY, DICHORIONIC/DIAMNIOTIC, THIRD TRIMESTER: ICD-10-CM

## 2023-12-06 DIAGNOSIS — O26.879 CERVICAL SHORTENING, UNSPECIFIED TRIMESTER: ICD-10-CM

## 2023-12-06 LAB
CULTURE RESULTS: SIGNIFICANT CHANGE UP
CULTURE RESULTS: SIGNIFICANT CHANGE UP
SPECIMEN SOURCE: SIGNIFICANT CHANGE UP
SPECIMEN SOURCE: SIGNIFICANT CHANGE UP

## 2023-12-06 RX ORDER — RESPIRATORY SYNCYTIAL VIRUS VACCINE 120MCG/0.5
120 KIT INTRAMUSCULAR ONCE
Qty: 0.5 | Refills: 0 | Status: ACTIVE | COMMUNITY
Start: 2023-12-06 | End: 1900-01-01

## 2023-12-08 ENCOUNTER — NON-APPOINTMENT (OUTPATIENT)
Age: 33
End: 2023-12-08

## 2023-12-18 ENCOUNTER — ASOB RESULT (OUTPATIENT)
Age: 33
End: 2023-12-18

## 2023-12-18 ENCOUNTER — RESULT REVIEW (OUTPATIENT)
Age: 33
End: 2023-12-18

## 2023-12-18 ENCOUNTER — OUTPATIENT (OUTPATIENT)
Dept: OUTPATIENT SERVICES | Facility: HOSPITAL | Age: 33
LOS: 1 days | End: 2023-12-18

## 2023-12-18 ENCOUNTER — NON-APPOINTMENT (OUTPATIENT)
Age: 33
End: 2023-12-18

## 2023-12-18 ENCOUNTER — APPOINTMENT (OUTPATIENT)
Dept: MATERNAL FETAL MEDICINE | Facility: HOSPITAL | Age: 33
End: 2023-12-18
Payer: MEDICAID

## 2023-12-18 ENCOUNTER — APPOINTMENT (OUTPATIENT)
Dept: ANTEPARTUM | Facility: HOSPITAL | Age: 33
End: 2023-12-18
Payer: MEDICAID

## 2023-12-18 VITALS
BODY MASS INDEX: 28.28 KG/M2 | HEART RATE: 73 BPM | WEIGHT: 176 LBS | SYSTOLIC BLOOD PRESSURE: 107 MMHG | TEMPERATURE: 97.8 F | DIASTOLIC BLOOD PRESSURE: 55 MMHG | HEIGHT: 66 IN

## 2023-12-18 DIAGNOSIS — Z98.890 OTHER SPECIFIED POSTPROCEDURAL STATES: Chronic | ICD-10-CM

## 2023-12-18 DIAGNOSIS — Z33.2 ENCOUNTER FOR ELECTIVE TERMINATION OF PREGNANCY: Chronic | ICD-10-CM

## 2023-12-18 LAB
CANDIDA AB TITR SER: SIGNIFICANT CHANGE UP
CANDIDA AB TITR SER: SIGNIFICANT CHANGE UP
G VAGINALIS DNA SPEC QL NAA+PROBE: SIGNIFICANT CHANGE UP
G VAGINALIS DNA SPEC QL NAA+PROBE: SIGNIFICANT CHANGE UP
T VAGINALIS SPEC QL WET PREP: SIGNIFICANT CHANGE UP
T VAGINALIS SPEC QL WET PREP: SIGNIFICANT CHANGE UP

## 2023-12-18 PROCEDURE — 76820 UMBILICAL ARTERY ECHO: CPT | Mod: 26,59

## 2023-12-18 PROCEDURE — 76816 OB US FOLLOW-UP PER FETUS: CPT | Mod: 26,59

## 2023-12-18 PROCEDURE — 76818 FETAL BIOPHYS PROFILE W/NST: CPT | Mod: 26,59

## 2023-12-18 PROCEDURE — 76816 OB US FOLLOW-UP PER FETUS: CPT | Mod: 26

## 2023-12-19 DIAGNOSIS — N88.3 INCOMPETENCE OF CERVIX UTERI: ICD-10-CM

## 2023-12-19 DIAGNOSIS — O30.049 TWIN PREGNANCY, DICHORIONIC/DIAMNIOTIC, UNSPECIFIED TRIMESTER: ICD-10-CM

## 2023-12-19 DIAGNOSIS — O09.90 SUPERVISION OF HIGH RISK PREGNANCY, UNSPECIFIED, UNSPECIFIED TRIMESTER: ICD-10-CM

## 2023-12-19 LAB
C TRACH RRNA SPEC QL NAA+PROBE: SIGNIFICANT CHANGE UP
C TRACH RRNA SPEC QL NAA+PROBE: SIGNIFICANT CHANGE UP
N GONORRHOEA RRNA SPEC QL NAA+PROBE: SIGNIFICANT CHANGE UP
N GONORRHOEA RRNA SPEC QL NAA+PROBE: SIGNIFICANT CHANGE UP
SPECIMEN SOURCE: SIGNIFICANT CHANGE UP
SPECIMEN SOURCE: SIGNIFICANT CHANGE UP

## 2023-12-22 ENCOUNTER — OUTPATIENT (OUTPATIENT)
Dept: INPATIENT UNIT | Facility: HOSPITAL | Age: 33
LOS: 1 days | End: 2023-12-22
Payer: MEDICAID

## 2023-12-22 ENCOUNTER — APPOINTMENT (OUTPATIENT)
Dept: ANTEPARTUM | Facility: CLINIC | Age: 33
End: 2023-12-22
Payer: MEDICAID

## 2023-12-22 ENCOUNTER — ASOB RESULT (OUTPATIENT)
Age: 33
End: 2023-12-22

## 2023-12-22 VITALS
TEMPERATURE: 99 F | SYSTOLIC BLOOD PRESSURE: 133 MMHG | RESPIRATION RATE: 16 BRPM | HEART RATE: 93 BPM | DIASTOLIC BLOOD PRESSURE: 68 MMHG

## 2023-12-22 VITALS — SYSTOLIC BLOOD PRESSURE: 128 MMHG | HEART RATE: 82 BPM | DIASTOLIC BLOOD PRESSURE: 77 MMHG

## 2023-12-22 DIAGNOSIS — O09.293 SUPERVISION OF PREGNANCY WITH OTHER POOR REPRODUCTIVE OR OBSTETRIC HISTORY, THIRD TRIMESTER: ICD-10-CM

## 2023-12-22 DIAGNOSIS — O34.33 MATERNAL CARE FOR CERVICAL INCOMPETENCE, THIRD TRIMESTER: ICD-10-CM

## 2023-12-22 DIAGNOSIS — O36.5931 MATERNAL CARE FOR OTHER KNOWN OR SUSPECTED POOR FETAL GROWTH, THIRD TRIMESTER, FETUS 1: ICD-10-CM

## 2023-12-22 DIAGNOSIS — O26.899 OTHER SPECIFIED PREGNANCY RELATED CONDITIONS, UNSPECIFIED TRIMESTER: ICD-10-CM

## 2023-12-22 DIAGNOSIS — Z3A.37 37 WEEKS GESTATION OF PREGNANCY: ICD-10-CM

## 2023-12-22 DIAGNOSIS — O26.873 CERVICAL SHORTENING, THIRD TRIMESTER: ICD-10-CM

## 2023-12-22 DIAGNOSIS — Z98.890 OTHER SPECIFIED POSTPROCEDURAL STATES: Chronic | ICD-10-CM

## 2023-12-22 DIAGNOSIS — O61.9 FAILED INDUCTION OF LABOR, UNSPECIFIED: ICD-10-CM

## 2023-12-22 DIAGNOSIS — O30.043 TWIN PREGNANCY, DICHORIONIC/DIAMNIOTIC, THIRD TRIMESTER: ICD-10-CM

## 2023-12-22 DIAGNOSIS — Z33.2 ENCOUNTER FOR ELECTIVE TERMINATION OF PREGNANCY: Chronic | ICD-10-CM

## 2023-12-22 PROCEDURE — 76820 UMBILICAL ARTERY ECHO: CPT

## 2023-12-22 PROCEDURE — 76819 FETAL BIOPHYS PROFIL W/O NST: CPT | Mod: 59

## 2023-12-22 PROCEDURE — 76820 UMBILICAL ARTERY ECHO: CPT | Mod: 59

## 2023-12-22 PROCEDURE — 76819 FETAL BIOPHYS PROFIL W/O NST: CPT

## 2023-12-22 PROCEDURE — 99221 1ST HOSP IP/OBS SF/LOW 40: CPT

## 2023-12-22 NOTE — OB PROVIDER TRIAGE NOTE - NSHPPHYSICALEXAM_GEN_ALL_CORE
ICU Vital Signs Last 24 Hrs  T(C): 37.3 (22 Dec 2023 15:09), Max: 37.3 (22 Dec 2023 15:09)  T(F): 99.1 (22 Dec 2023 15:09), Max: 99.1 (22 Dec 2023 15:09)  HR: 80 (22 Dec 2023 15:54) (80 - 93)  BP: 132/77 (22 Dec 2023 15:54) (132/77 - 133/68)  BP(mean): --  ABP: --  ABP(mean): --  RR: 16 (22 Dec 2023 15:09) (16 - 16)  SpO2: --    Gen: A&O x 3; NAD    Neuro- symmetrical facial features with no slurring of words; affect appropriate  Pulm- breathing is unlabored  Abd exam- soft and nontender  Extremities- full range of motion x 4    Sono performed in ATU    NST Baby A-> 140 baseline with min variability, no accels; Baby B-> 135 baseline with accels and mod variability; irreg ctx's

## 2023-12-22 NOTE — OB RN TRIAGE NOTE - NS_TRIAGEADDITIONAL COMMENTS_OBGYN_ALL_OB_FT
14:40-pt to be eval in DT as per VINOD Davies. Pt refusing to be admitted. awaiting available DT rm for evaluation/NST  14:51-ALBERT Rodriguez charge notified of plan change and informed PT is waiting in lobby for available eval rm 14:40-pt to be eval in DT as per VINOD Davies. Pt refusing to be admitted. awaiting available DT rm for evaluation/NST  14:51-ALBERT Rodriguez charge notified of plan change and informed PT is waiting in lobby for available eval rm  5'6  173

## 2023-12-22 NOTE — CHART NOTE - NSCHARTNOTEFT_GEN_A_CORE
AMA Note      This is a 32y/o  @37w with gracia TIUP with IUGR of Twin A in 5% with new elevated UAD today presenting from ATU for delivery. Presentation is vtx/transverse. Recommendation for CS. Patient declining CS at this time. She was counseled on risks of deferring delivery at this time including but not limited to IUFD and maternal death. Patient aware and signed AMA consents. All questions answered.     D/w Dr. Jefferson and Dr. Venecia Little, PGY-4 AMA Note      This is a 34y/o  @37w with gracia TIUP with IUGR of Twin A in 5% with new elevated UAD today presenting from ATU for delivery. Presentation is vtx/transverse. Recommendation for CS. Patient declining CS at this time. She was counseled on risks of deferring delivery at this time including but not limited to IUFD and maternal death. Patient aware and signed AMA consents. All questions answered.     D/w Dr. Jefferson and Dr. Venecia Little, PGY-4

## 2023-12-22 NOTE — OB PROVIDER TRIAGE NOTE - NSOBPROVIDERNOTE_OBGYN_ALL_OB_FT
32yo female  @ 37.0 with di/di TIUP with IUGR and elevated Dopplers of Baby A here from ATU  -pt refusing to be delivered today  -Dr Little counseled pt about risks of not being delivered given IUGR fetus  -Dr Little in to  pt again to  pt and sign AMA papers 34yo female  @ 37.0 with di/di TIUP with IUGR and elevated Dopplers of Baby A here from ATU  -pt refusing to be delivered today  -Dr Little counseled pt about risks of not being delivered given IUGR fetus  -Dr Little in to  pt again to  pt and sign AMA papers

## 2023-12-22 NOTE — OB RN TRIAGE NOTE - FALL HARM RISK - RISK INTERVENTIONS
Communicate Fall Risk and Risk Factors to all staff, patient, and family/Reinforce activity limits and safety measures with patient and family/Visual Cue: Yellow wristband/Bed in lowest position, wheels locked, appropriate side rails in place/Call bell, personal items and telephone in reach/Instruct patient to call for assistance before getting out of bed or chair/Non-slip footwear when patient is out of bed/[place] to call system/Physically safe environment - no spills, clutter or unnecessary equipment/Purposeful Proactive Rounding/Room/bathroom lighting operational, light cord in reach Communicate Fall Risk and Risk Factors to all staff, patient, and family/Reinforce activity limits and safety measures with patient and family/Visual Cue: Yellow wristband/Bed in lowest position, wheels locked, appropriate side rails in place/Call bell, personal items and telephone in reach/Instruct patient to call for assistance before getting out of bed or chair/Non-slip footwear when patient is out of bed/[place] to call system/Physically safe environment - no spills, clutter or unnecessary equipment/Purposeful Proactive Rounding/Room/bathroom lighting operational, light cord in reach

## 2023-12-22 NOTE — OB PROVIDER TRIAGE NOTE - HISTORY OF PRESENT ILLNESS
32yo female  @ 37.0 wks with di/di TIUP here from ATU for delivery due to IUGR of fetus A with elevated Dopplers. Pt reports GFM and denies VB/LOF/ ctx's. PNC complicated by short cervix and has been on PV progesterone.    AP course complicated by short cervix, on PV progesterone. TIUP with IUGR of fetus A with elevated Dopplers 34yo female  @ 37.0 wks with di/di TIUP here from ATU for delivery due to IUGR of fetus A with elevated Dopplers. Pt reports GFM and denies VB/LOF/ ctx's. PNC complicated by short cervix and has been on PV progesterone.    AP course complicated by short cervix, on PV progesterone. TIUP with IUGR of fetus A with elevated Dopplers

## 2023-12-26 ENCOUNTER — APPOINTMENT (OUTPATIENT)
Dept: ANTEPARTUM | Facility: HOSPITAL | Age: 33
End: 2023-12-26
Payer: MEDICAID

## 2023-12-26 ENCOUNTER — OUTPATIENT (OUTPATIENT)
Dept: OUTPATIENT SERVICES | Facility: HOSPITAL | Age: 33
LOS: 1 days | End: 2023-12-26

## 2023-12-26 ENCOUNTER — NON-APPOINTMENT (OUTPATIENT)
Age: 33
End: 2023-12-26

## 2023-12-26 VITALS
DIASTOLIC BLOOD PRESSURE: 79 MMHG | WEIGHT: 177 LBS | HEART RATE: 112 BPM | BODY MASS INDEX: 28.45 KG/M2 | TEMPERATURE: 97.9 F | HEIGHT: 66 IN | SYSTOLIC BLOOD PRESSURE: 118 MMHG

## 2023-12-26 DIAGNOSIS — O30.042 TWIN PREGNANCY, DICHORIONIC/DIAMNIOTIC, SECOND TRIMESTER: ICD-10-CM

## 2023-12-26 DIAGNOSIS — Z98.890 OTHER SPECIFIED POSTPROCEDURAL STATES: Chronic | ICD-10-CM

## 2023-12-26 DIAGNOSIS — Z34.90 ENCOUNTER FOR SUPERVISION OF NORMAL PREGNANCY, UNSPECIFIED, UNSPECIFIED TRIMESTER: ICD-10-CM

## 2023-12-26 DIAGNOSIS — Z33.2 ENCOUNTER FOR ELECTIVE TERMINATION OF PREGNANCY: Chronic | ICD-10-CM

## 2023-12-26 DIAGNOSIS — O26.879 CERVICAL SHORTENING, UNSPECIFIED TRIMESTER: ICD-10-CM

## 2023-12-26 PROCEDURE — 99213 OFFICE O/P EST LOW 20 MIN: CPT | Mod: TH,GC,25

## 2023-12-27 ENCOUNTER — INPATIENT (INPATIENT)
Facility: HOSPITAL | Age: 33
LOS: 2 days | Discharge: HOME | End: 2023-12-30
Attending: SPECIALIST | Admitting: SPECIALIST
Payer: MEDICAID

## 2023-12-27 ENCOUNTER — APPOINTMENT (OUTPATIENT)
Dept: ANTEPARTUM | Facility: CLINIC | Age: 33
End: 2023-12-27

## 2023-12-27 VITALS — HEART RATE: 96 BPM | OXYGEN SATURATION: 98 %

## 2023-12-27 DIAGNOSIS — O30.049 TWIN PREGNANCY, DICHORIONIC/DIAMNIOTIC, UNSPECIFIED TRIMESTER: ICD-10-CM

## 2023-12-27 DIAGNOSIS — Z98.890 OTHER SPECIFIED POSTPROCEDURAL STATES: Chronic | ICD-10-CM

## 2023-12-27 DIAGNOSIS — Z33.2 ENCOUNTER FOR ELECTIVE TERMINATION OF PREGNANCY: Chronic | ICD-10-CM

## 2023-12-27 LAB
BASOPHILS # BLD AUTO: 0.04 K/UL — SIGNIFICANT CHANGE UP (ref 0–0.2)
BASOPHILS # BLD AUTO: 0.04 K/UL — SIGNIFICANT CHANGE UP (ref 0–0.2)
BASOPHILS NFR BLD AUTO: 0.3 % — SIGNIFICANT CHANGE UP (ref 0–2)
BASOPHILS NFR BLD AUTO: 0.3 % — SIGNIFICANT CHANGE UP (ref 0–2)
BLD GP AB SCN SERPL QL: NEGATIVE — SIGNIFICANT CHANGE UP
BLD GP AB SCN SERPL QL: NEGATIVE — SIGNIFICANT CHANGE UP
EOSINOPHIL # BLD AUTO: 0.01 K/UL — SIGNIFICANT CHANGE UP (ref 0–0.5)
EOSINOPHIL # BLD AUTO: 0.01 K/UL — SIGNIFICANT CHANGE UP (ref 0–0.5)
EOSINOPHIL NFR BLD AUTO: 0.1 % — SIGNIFICANT CHANGE UP (ref 0–6)
EOSINOPHIL NFR BLD AUTO: 0.1 % — SIGNIFICANT CHANGE UP (ref 0–6)
HCT VFR BLD CALC: 40.2 % — SIGNIFICANT CHANGE UP (ref 34.5–45)
HCT VFR BLD CALC: 40.2 % — SIGNIFICANT CHANGE UP (ref 34.5–45)
HGB BLD-MCNC: 13.9 G/DL — SIGNIFICANT CHANGE UP (ref 11.5–15.5)
HGB BLD-MCNC: 13.9 G/DL — SIGNIFICANT CHANGE UP (ref 11.5–15.5)
IANC: 8.06 K/UL — HIGH (ref 1.8–7.4)
IANC: 8.06 K/UL — HIGH (ref 1.8–7.4)
IMM GRANULOCYTES NFR BLD AUTO: 0.4 % — SIGNIFICANT CHANGE UP (ref 0–0.9)
IMM GRANULOCYTES NFR BLD AUTO: 0.4 % — SIGNIFICANT CHANGE UP (ref 0–0.9)
LYMPHOCYTES # BLD AUTO: 25.8 % — SIGNIFICANT CHANGE UP (ref 13–44)
LYMPHOCYTES # BLD AUTO: 25.8 % — SIGNIFICANT CHANGE UP (ref 13–44)
LYMPHOCYTES # BLD AUTO: 3.06 K/UL — SIGNIFICANT CHANGE UP (ref 1–3.3)
LYMPHOCYTES # BLD AUTO: 3.06 K/UL — SIGNIFICANT CHANGE UP (ref 1–3.3)
MCHC RBC-ENTMCNC: 32.3 PG — SIGNIFICANT CHANGE UP (ref 27–34)
MCHC RBC-ENTMCNC: 32.3 PG — SIGNIFICANT CHANGE UP (ref 27–34)
MCHC RBC-ENTMCNC: 34.6 GM/DL — SIGNIFICANT CHANGE UP (ref 32–36)
MCHC RBC-ENTMCNC: 34.6 GM/DL — SIGNIFICANT CHANGE UP (ref 32–36)
MCV RBC AUTO: 93.5 FL — SIGNIFICANT CHANGE UP (ref 80–100)
MCV RBC AUTO: 93.5 FL — SIGNIFICANT CHANGE UP (ref 80–100)
MONOCYTES # BLD AUTO: 0.62 K/UL — SIGNIFICANT CHANGE UP (ref 0–0.9)
MONOCYTES # BLD AUTO: 0.62 K/UL — SIGNIFICANT CHANGE UP (ref 0–0.9)
MONOCYTES NFR BLD AUTO: 5.2 % — SIGNIFICANT CHANGE UP (ref 2–14)
MONOCYTES NFR BLD AUTO: 5.2 % — SIGNIFICANT CHANGE UP (ref 2–14)
NEUTROPHILS # BLD AUTO: 8.06 K/UL — HIGH (ref 1.8–7.4)
NEUTROPHILS # BLD AUTO: 8.06 K/UL — HIGH (ref 1.8–7.4)
NEUTROPHILS NFR BLD AUTO: 68.2 % — SIGNIFICANT CHANGE UP (ref 43–77)
NEUTROPHILS NFR BLD AUTO: 68.2 % — SIGNIFICANT CHANGE UP (ref 43–77)
NRBC # BLD: 0 /100 WBCS — SIGNIFICANT CHANGE UP (ref 0–0)
NRBC # BLD: 0 /100 WBCS — SIGNIFICANT CHANGE UP (ref 0–0)
NRBC # FLD: 0 K/UL — SIGNIFICANT CHANGE UP (ref 0–0)
NRBC # FLD: 0 K/UL — SIGNIFICANT CHANGE UP (ref 0–0)
PLATELET # BLD AUTO: 279 K/UL — SIGNIFICANT CHANGE UP (ref 150–400)
PLATELET # BLD AUTO: 279 K/UL — SIGNIFICANT CHANGE UP (ref 150–400)
RBC # BLD: 4.3 M/UL — SIGNIFICANT CHANGE UP (ref 3.8–5.2)
RBC # BLD: 4.3 M/UL — SIGNIFICANT CHANGE UP (ref 3.8–5.2)
RBC # FLD: 12.9 % — SIGNIFICANT CHANGE UP (ref 10.3–14.5)
RBC # FLD: 12.9 % — SIGNIFICANT CHANGE UP (ref 10.3–14.5)
RH IG SCN BLD-IMP: POSITIVE — SIGNIFICANT CHANGE UP
RH IG SCN BLD-IMP: POSITIVE — SIGNIFICANT CHANGE UP
WBC # BLD: 11.84 K/UL — HIGH (ref 3.8–10.5)
WBC # BLD: 11.84 K/UL — HIGH (ref 3.8–10.5)
WBC # FLD AUTO: 11.84 K/UL — HIGH (ref 3.8–10.5)
WBC # FLD AUTO: 11.84 K/UL — HIGH (ref 3.8–10.5)

## 2023-12-27 RX ORDER — SODIUM CHLORIDE 9 MG/ML
1000 INJECTION, SOLUTION INTRAVENOUS
Refills: 0 | Status: DISCONTINUED | OUTPATIENT
Start: 2023-12-27 | End: 2023-12-28

## 2023-12-27 RX ORDER — CHLORHEXIDINE GLUCONATE 213 G/1000ML
1 SOLUTION TOPICAL DAILY
Refills: 0 | Status: DISCONTINUED | OUTPATIENT
Start: 2023-12-27 | End: 2023-12-28

## 2023-12-27 RX ORDER — OXYTOCIN 10 UNIT/ML
333.33 VIAL (ML) INJECTION
Qty: 20 | Refills: 0 | Status: DISCONTINUED | OUTPATIENT
Start: 2023-12-27 | End: 2023-12-29

## 2023-12-27 RX ORDER — CITRIC ACID/SODIUM CITRATE 300-500 MG
15 SOLUTION, ORAL ORAL EVERY 6 HOURS
Refills: 0 | Status: DISCONTINUED | OUTPATIENT
Start: 2023-12-27 | End: 2023-12-28

## 2023-12-27 RX ADMIN — SODIUM CHLORIDE 125 MILLILITER(S): 9 INJECTION, SOLUTION INTRAVENOUS at 21:32

## 2023-12-27 NOTE — OB PROVIDER H&P - ASSESSMENT
Assessment  33y   @ 37w5 presents for IOL for TIUP and IUGR baby A.     Plan  1. Admit to L+D. Routine Labs. IVF.  2. Expectant management/IOL w/ buccal cytotec.  3. Fetus: cat 1 tracing. VTX. EFW EFW 2294g by Baby A. 2635g Baby B by sono on . Continuous EFM. Sono. No concerns.  4. Prenatal issues: TIUP, IUGR baby A  5. GBS (-)  6. Pain: epidural PRN    Plan d/w Dr. Jefferson, notified safety officers Dr. Cuadra and Dr Mary Kay Matthews, PGY-1   Assessment  33y   @ 37w5 presents for IOL for TIUP and IUGR baby A.     Plan  1. Admit to L+D. Routine Labs. IVF.  2. IOL w/ buccal cytotec.  3. Fetus: cat 1 tracing. VTX. EFW EFW 2294g by Baby A. 2635g Baby B by sono on . Continuous EFM. Sono. No concerns.  4. Prenatal issues: TIUP, IUGR baby A  5. GBS (-)  6. Pain: epidural PRN    Plan d/w Dr. Jefferson, notified safety officers Dr. Cuadra and Dr Mary Kay Matthews, PGY-1

## 2023-12-27 NOTE — OB RN PATIENT PROFILE - NSMATERNALFETALCONCERNS_OBGYN_ALL_OB_FT
````````````````````````````````````````````````````````````````````````````````````````````````````````````````````````````````````````````````````````````````````````````````````````````````````````````````````````````````````````````````````````````````````````````````````````````````````````````````````````````````````````````````````````````````````````````````````````````````````````````````````````````````````````````````````````````````````````````````````````````````````````````````````````````````````````````````````````````````````````````````````````````````````````````````````````````````````````````````````````````````

## 2023-12-27 NOTE — OB RN PATIENT PROFILE - NS_OBGYNHISTORY_OBGYN_ALL_OB_FT
negative -Complications with current pregnancy (Short cervix previously on progesterone)   -   -SAB x1  -VTOP with D&C

## 2023-12-27 NOTE — OB PROVIDER H&P - NSHPPHYSICALEXAM_GEN_ALL_CORE
Objective  – VS  T(C): --  HR: 94 (12-27-23 @ 21:32)  BP: 125/74 (12-27-23 @ 21:25)  RR: --  SpO2: 98% (12-27-23 @ 21:32)    Physical Exam  CV: RRR  Pulm: breathing comfortably on RA  Abd: gravid, nontender  Extr: moving all extremities with ease  – VE: 0/0/-3  – FHT: A: baseline 135, mod variability, +accels, -decels, B: baseline 140, mod variability, +accels - decels   – [place]: not oleksandr on the monitor  – EFW:  EFW 2294g by Baby A. 2635g Baby B  – Sono: A: vertex, B: Transverse Objective  – VS  T(C): --  HR: 94 (12-27-23 @ 21:32)  BP: 125/74 (12-27-23 @ 21:25)  RR: --  SpO2: 98% (12-27-23 @ 21:32)    Physical Exam  CV: RRR  Pulm: breathing comfortably on RA  Abd: gravid, nontender  Extr: moving all extremities with ease  – VE: 0/0/-3  – FHT: A: baseline 135, mod variability, +accels, -decels, B: baseline 140, mod variability, +accels - decels   – [place]: not oleksandr on the monitor  – EFW:  EFW 2294g by Baby A. 2635g Baby B  – Sono: A: vertex, B: Transverse

## 2023-12-27 NOTE — OB RN PATIENT PROFILE - FALL HARM RISK - UNIVERSAL INTERVENTIONS
Bed in lowest position, wheels locked, appropriate side rails in place/Call bell, personal items and telephone in reach/Instruct patient to call for assistance before getting out of bed or chair/Non-slip footwear when patient is out of bed/[place] to call system/Physically safe environment - no spills, clutter or unnecessary equipment/Purposeful Proactive Rounding/Room/bathroom lighting operational, light cord in reach Bed in lowest position, wheels locked, appropriate side rails in place/Call bell, personal items and telephone in reach/Instruct patient to call for assistance before getting out of bed or chair/Non-slip footwear when patient is out of bed/[place] to call system/Physically safe environment - no spills, clutter or unnecessary equipment/Purposeful Proactive Rounding/Room/bathroom lighting operational, light cord in reach

## 2023-12-27 NOTE — OB PROVIDER IHI INDUCTION/AUGMENTATION NOTE - NS_OBIHIFHRDETAILS_OBGYN_ALL_OB_FT
A: baseline 135, mod variability, +accels, -decels, B: baseline 140, mod variability, +accels - decels

## 2023-12-27 NOTE — OB PROVIDER H&P - HISTORY OF PRESENT ILLNESS
R1 Admission H&P    Subjective  HPI: 33y  @ 37w5 presents for IOL 2/2 TIUP and IUGR baby A, Contractions q10-15 minutes    +FM. -LOF. -VB. Pt denies any other concerns.    – PNC: TIUP, IUGR BabyA . GBS neg. EFW 2294g by Baby A. 2635g Baby B on sono.  – OBHx:   G1 ()- TOPx1 w/ D&C  G2 ()- SAB no D&C  G3 ()-  FT 7#4  – GynHx: denies fibroids, cysts, endometriosis, abnormal pap smears, STIs  – PMH: childhood asthma with past inhaler use, no use for multiple years   – PSH: denies  – Psych: reports Anx, no formal dx    – Social: denies   – Meds: PNV, ASA    – Allergies: NKDA  – Will accept blood transfusions? Yes

## 2023-12-28 ENCOUNTER — NON-APPOINTMENT (OUTPATIENT)
Age: 33
End: 2023-12-28

## 2023-12-28 ENCOUNTER — RESULT REVIEW (OUTPATIENT)
Age: 33
End: 2023-12-28

## 2023-12-28 DIAGNOSIS — O30.049 TWIN PREGNANCY, DICHORIONIC/DIAMNIOTIC, UNSPECIFIED TRIMESTER: ICD-10-CM

## 2023-12-28 LAB
RH IG SCN BLD-IMP: POSITIVE — SIGNIFICANT CHANGE UP
RH IG SCN BLD-IMP: POSITIVE — SIGNIFICANT CHANGE UP

## 2023-12-28 PROCEDURE — 59409 OBSTETRICAL CARE: CPT | Mod: U7,GC

## 2023-12-28 PROCEDURE — 88307 TISSUE EXAM BY PATHOLOGIST: CPT | Mod: 26

## 2023-12-28 RX ORDER — OXYCODONE HYDROCHLORIDE 5 MG/1
5 TABLET ORAL ONCE
Refills: 0 | Status: DISCONTINUED | OUTPATIENT
Start: 2023-12-28 | End: 2023-12-30

## 2023-12-28 RX ORDER — DIBUCAINE 1 %
1 OINTMENT (GRAM) RECTAL EVERY 6 HOURS
Refills: 0 | Status: DISCONTINUED | OUTPATIENT
Start: 2023-12-28 | End: 2023-12-30

## 2023-12-28 RX ORDER — OXYCODONE HYDROCHLORIDE 5 MG/1
5 TABLET ORAL
Refills: 0 | Status: DISCONTINUED | OUTPATIENT
Start: 2023-12-28 | End: 2023-12-30

## 2023-12-28 RX ORDER — LANOLIN
1 OINTMENT (GRAM) TOPICAL EVERY 6 HOURS
Refills: 0 | Status: DISCONTINUED | OUTPATIENT
Start: 2023-12-28 | End: 2023-12-30

## 2023-12-28 RX ORDER — SODIUM CHLORIDE 9 MG/ML
3 INJECTION INTRAMUSCULAR; INTRAVENOUS; SUBCUTANEOUS EVERY 8 HOURS
Refills: 0 | Status: DISCONTINUED | OUTPATIENT
Start: 2023-12-28 | End: 2023-12-30

## 2023-12-28 RX ORDER — KETOROLAC TROMETHAMINE 30 MG/ML
30 SYRINGE (ML) INJECTION ONCE
Refills: 0 | Status: DISCONTINUED | OUTPATIENT
Start: 2023-12-28 | End: 2023-12-28

## 2023-12-28 RX ORDER — OXYTOCIN 10 UNIT/ML
41.67 VIAL (ML) INJECTION
Qty: 20 | Refills: 0 | Status: DISCONTINUED | OUTPATIENT
Start: 2023-12-28 | End: 2023-12-29

## 2023-12-28 RX ORDER — IBUPROFEN 200 MG
600 TABLET ORAL EVERY 6 HOURS
Refills: 0 | Status: COMPLETED | OUTPATIENT
Start: 2023-12-28 | End: 2024-11-25

## 2023-12-28 RX ORDER — TETANUS TOXOID, REDUCED DIPHTHERIA TOXOID AND ACELLULAR PERTUSSIS VACCINE, ADSORBED 5; 2.5; 8; 8; 2.5 [IU]/.5ML; [IU]/.5ML; UG/.5ML; UG/.5ML; UG/.5ML
0.5 SUSPENSION INTRAMUSCULAR ONCE
Refills: 0 | Status: DISCONTINUED | OUTPATIENT
Start: 2023-12-28 | End: 2023-12-30

## 2023-12-28 RX ORDER — OXYTOCIN 10 UNIT/ML
2 VIAL (ML) INJECTION
Qty: 30 | Refills: 0 | Status: DISCONTINUED | OUTPATIENT
Start: 2023-12-28 | End: 2023-12-28

## 2023-12-28 RX ORDER — BENZOCAINE 10 %
1 GEL (GRAM) MUCOUS MEMBRANE EVERY 6 HOURS
Refills: 0 | Status: DISCONTINUED | OUTPATIENT
Start: 2023-12-28 | End: 2023-12-30

## 2023-12-28 RX ORDER — ACETAMINOPHEN 500 MG
975 TABLET ORAL
Refills: 0 | Status: DISCONTINUED | OUTPATIENT
Start: 2023-12-28 | End: 2023-12-30

## 2023-12-28 RX ORDER — IBUPROFEN 200 MG
600 TABLET ORAL EVERY 6 HOURS
Refills: 0 | Status: DISCONTINUED | OUTPATIENT
Start: 2023-12-28 | End: 2023-12-30

## 2023-12-28 RX ORDER — AER TRAVELER 0.5 G/1
1 SOLUTION RECTAL; TOPICAL EVERY 4 HOURS
Refills: 0 | Status: DISCONTINUED | OUTPATIENT
Start: 2023-12-28 | End: 2023-12-30

## 2023-12-28 RX ORDER — SIMETHICONE 80 MG/1
80 TABLET, CHEWABLE ORAL EVERY 4 HOURS
Refills: 0 | Status: DISCONTINUED | OUTPATIENT
Start: 2023-12-28 | End: 2023-12-30

## 2023-12-28 RX ORDER — DIPHENHYDRAMINE HCL 50 MG
25 CAPSULE ORAL EVERY 6 HOURS
Refills: 0 | Status: DISCONTINUED | OUTPATIENT
Start: 2023-12-28 | End: 2023-12-30

## 2023-12-28 RX ORDER — PRAMOXINE HYDROCHLORIDE 150 MG/15G
1 AEROSOL, FOAM RECTAL EVERY 4 HOURS
Refills: 0 | Status: DISCONTINUED | OUTPATIENT
Start: 2023-12-28 | End: 2023-12-30

## 2023-12-28 RX ORDER — HYDROCORTISONE 1 %
1 OINTMENT (GRAM) TOPICAL EVERY 6 HOURS
Refills: 0 | Status: DISCONTINUED | OUTPATIENT
Start: 2023-12-28 | End: 2023-12-30

## 2023-12-28 RX ORDER — MAGNESIUM HYDROXIDE 400 MG/1
30 TABLET, CHEWABLE ORAL
Refills: 0 | Status: DISCONTINUED | OUTPATIENT
Start: 2023-12-28 | End: 2023-12-30

## 2023-12-28 RX ADMIN — Medication 2 MILLIUNIT(S)/MIN: at 07:49

## 2023-12-28 RX ADMIN — Medication 0.2 MILLIGRAM(S): at 11:34

## 2023-12-28 RX ADMIN — Medication 2 MILLIUNIT(S)/MIN: at 06:12

## 2023-12-28 RX ADMIN — SODIUM CHLORIDE 125 MILLILITER(S): 9 INJECTION, SOLUTION INTRAVENOUS at 07:49

## 2023-12-28 RX ADMIN — SODIUM CHLORIDE 3 MILLILITER(S): 9 INJECTION INTRAMUSCULAR; INTRAVENOUS; SUBCUTANEOUS at 16:31

## 2023-12-28 RX ADMIN — Medication 1 TABLET(S): at 18:56

## 2023-12-28 RX ADMIN — Medication 975 MILLIGRAM(S): at 18:56

## 2023-12-28 RX ADMIN — CHLORHEXIDINE GLUCONATE 1 APPLICATION(S): 213 SOLUTION TOPICAL at 07:50

## 2023-12-28 RX ADMIN — Medication 30 MILLIGRAM(S): at 15:20

## 2023-12-28 NOTE — OB PROVIDER LABOR PROGRESS NOTE - ASSESSMENT
33y 37w6 IOL for TIUP     - s/p BC   - For pit   - Continue EFM/TOCO  - Epi prn  - Anticipate      d/w Dr. Mary Kay Matthews, PGY1

## 2023-12-28 NOTE — OB PROVIDER LABOR PROGRESS NOTE - ASSESSMENT
A/P 33y P1 @ 37/6 wks   -IOL: Pitocin 16 , increase as tracing allows   - AROM clear fluid   -Cat 1 tracing  -Analgesia: Epidural in place     Patient discussed on M Safety rounds, patient at this time with di di twins with malpresentation of baby B ( transverse )   Mode of delivery options for baby B include :   -spontaneous version of baby b to vertex after delivery of baby a => vaginal delivery   -version of baby b  to vertex after delivery of baby a => vaginal delivery   -breech extraction of baby b => vaginal delivery   - delivery      The above was relayed to the patient and partner, verbalized understanding. The patient again to be counseled by [facility]     d/w safety officers and [facility] (Dr. Perez )  Anastasiia Coffey PGY-4   A/P 33y P1 @ 37/6 wks   -IOL: Pitocin 16 , increase as tracing allows   - AROM clear fluid   -Cat 1 tracing  -Analgesia: Epidural in place     Patient discussed on M Safety rounds, patient at this time with di di twins with malpresentation of baby B ( transverse )   Mode of delivery options for baby B include :   -spontaneous version of baby b to vertex after delivery of baby a => vaginal delivery   -version of baby b  to vertex after delivery of baby a => vaginal delivery   -breech extraction of baby b => vaginal delivery   - delivery      The above was relayed to the patient and partner, verbalized understanding. The patient again to be counseled by [facility]     d/w safety officers and [facility] (Dr. Perez )  Anastasiia Coffey PGY-4

## 2023-12-28 NOTE — OB PROVIDER DELIVERY SUMMARY - NSPROVIDERDELIVERYNOTE_OBGYN_ALL_OB_FT
Spontaneous vaginal delivery of liveborn infant from OA position. Head, shoulders, and body delivered easily. Infant was suctioned. No mec. Delayed cord clamping and infant was passed to mother. Cord clamped and cut.     Baby B noted to still be transverse and membranes ruptured on assessment. Lower limbs located and vaginal breech extraction performed.   Placentas delivered intact with a 3 vessel cord. Fundal massage was given and uterine fundus was found to be firm. Vaginal exam revealed an intact cervix, vaginal walls and sulci. Patient had a 1st degree laceration in the perineum that was repaired with 2-0 chromic suture. Excellent hemostasis was noted. Patient was stable and went to recovery. Count was correct x 2.    IM Methergine given  for PPx    Anastasiia Coffey, PGY-4

## 2023-12-28 NOTE — OB NEONATOLOGY/PEDIATRICIAN DELIVERY SUMMARY - NSPEDSNEONOTESB_OBGYN_ALL_OB_FT
Peds called to OR for twin delivery, IUGR of Twin A, and transverse lie of Twin B. 37+6 wk AGA male born via breech  to a 32 y/o  mother.  Maternal history of asthma. Prenatal history of IUGR for Twin A and transverse lie for twin B. Maternal labs include Blood Type O+, HIV - , RPR NR , Rubella I , Hep B - , GBS - 12/4. AROM at 10:15 with clear fluids (ROM hours: 1). Baby was delivered via breech vaginal delivery. The baby emerged limp, pale, and with poor respiratory effort. The baby was immediately brought to the warmer and was warmed, dried, and suctioned. PPV initiated with 30 sec of life with settings of 20/5, 21% and performed for 30 sec total. At 4MOL, started on CPAP (max settings 5/40%) due to saturations in 60s%, and able to be weaned to RA by 20MOL. APGARS of 2/8. Mom plans to initiate breastfeeding, consents Hep B vaccine and consents circ.  Highest maternal temp: 36.5. EOS 0.04.    TOB: 11:21  BW: 2780 g    Physical Exam:  Gen: NAD, +grimace  HEENT: anterior fontanel open soft and flat, no cleft lip/palate, ears normal set, no ear pits or tags. no lesions in mouth/throat, nares clinically patent. +mild tongue tie   Resp: no increased work of breathing, good air entry b/l, clear to auscultation bilaterally  Cardio: Normal S1/S2, regular rate and rhythm, no murmurs, rubs or gallops  Abd: soft, non tender, non distended, + bowel sounds, umbilical cord with 3 vessels  Neuro: +grasp/suck/antolin, normal tone  Extremities: negative hernández and ortolani, moving all extremities, full range of motion x 4, no crepitus  Skin: pink, warm  Genitals: Normal male anatomy, testicles palpable in scrotum b/l, Max 1, anus patent Peds called to OR for twin delivery, IUGR of Twin A, and transverse lie of Twin B.     Baby a is a 37.6 wk SGA female born to a 30 y/o  mother via . PEDS called for twin delivery. Maternal history of asthma. Prenatal history for baby A complicated by IUGR. Maternal blood type O+. PNL negative, non-reactive, and immune. GBS negative on . AROM at 10:19 on , clear fluids. Baby born vigorous and crying spontaneously. Warmed, dried, stimulated. Apgars 9/9. EOS 0.04. Mom plans to breastfeed and consents hepB.   BW: 2230g   : 2023  TOB: 11:15    Physical Exam  Gen: NAD; well-appearing  HEENT: NC/AT; anterior fontanelle open and flat; no cleft palate appreciated  Skin: pink, warm, well-perfused, no rash  Resp: non-labored breathing  Abd: soft, NT/ND; umbilical cord with 3 vessels  Extremities: moving all extremities, no crepitus; hips negative O/B  MSK: no clavicular fracture appreciated  : Max I; no abnormalities; anus patent  Back: no sacral dimple  Neuro: +antolin, +babinski, grasp, good tone throughout     Baby B is a 37+6 wk AGA male born via breech  to a 30 y/o  mother.  Maternal history of asthma. Prenatal history of IUGR for Twin A and transverse lie for twin B. Maternal labs include Blood Type O+, HIV - , RPR NR , Rubella I , Hep B - , GBS - . AROM at 10:15 with clear fluids (ROM hours: 1). Baby was delivered via breech vaginal delivery. The baby emerged limp, pale, and with poor respiratory effort. The baby was immediately brought to the warmer and was warmed, dried, and suctioned. PPV initiated with 30 sec of life with settings of 20/5, 21% and performed for 30 sec total. At 4MOL, started on CPAP (max settings 5/40%) due to saturations in 60s%, and able to be weaned to RA by 20MOL. APGARS of 2/8. Mom plans to initiate breastfeeding, consents Hep B vaccine and consents circ.  Highest maternal temp: 36.5. EOS 0.04.    TOB: 11:21  BW: 2780 g    Physical Exam:  Gen: NAD, +grimace  HEENT: anterior fontanel open soft and flat, no cleft lip/palate, ears normal set, no ear pits or tags. no lesions in mouth/throat, nares clinically patent. +mild tongue tie   Resp: no increased work of breathing, good air entry b/l, clear to auscultation bilaterally  Cardio: Normal S1/S2, regular rate and rhythm, no murmurs, rubs or gallops  Abd: soft, non tender, non distended, + bowel sounds, umbilical cord with 3 vessels  Neuro: +grasp/suck/antolin, normal tone  Extremities: negative hernández and ortolani, moving all extremities, full range of motion x 4, no crepitus  Skin: pink, warm  Genitals: Normal male anatomy, testicles palpable in scrotum b/l, Max 1, anus patent Peds called to OR for twin delivery, IUGR of Twin A, and transverse lie of Twin B.     Baby a is a 37.6 wk SGA female born to a 32 y/o  mother via . PEDS called for twin delivery. Maternal history of asthma. Prenatal history for baby A complicated by IUGR. Maternal blood type O+. PNL negative, non-reactive, and immune. GBS negative on . AROM at 10:19 on , clear fluids. Baby born vigorous and crying spontaneously. Warmed, dried, stimulated. Apgars 9/9. EOS 0.04. Mom plans to breastfeed and consents hepB.   BW: 2230g   : 2023  TOB: 11:15    Physical Exam  Gen: NAD; well-appearing  HEENT: NC/AT; anterior fontanelle open and flat; no cleft palate appreciated  Skin: pink, warm, well-perfused, no rash  Resp: non-labored breathing  Abd: soft, NT/ND; umbilical cord with 3 vessels  Extremities: moving all extremities, no crepitus; hips negative O/B  MSK: no clavicular fracture appreciated  : Max I; no abnormalities; anus patent  Back: no sacral dimple  Neuro: +antolin, +babinski, grasp, good tone throughout     Baby B is a 37+6 wk AGA male born via breech  to a 32 y/o  mother.  Maternal history of asthma. Prenatal history of IUGR for Twin A and transverse lie for twin B. Maternal labs include Blood Type O+, HIV - , RPR NR , Rubella I , Hep B - , GBS - . AROM at 10:15 with clear fluids (ROM hours: 1). Baby was delivered via breech vaginal delivery. The baby emerged limp, pale, and with poor respiratory effort. The baby was immediately brought to the warmer and was warmed, dried, and suctioned. PPV initiated with 30 sec of life with settings of 20/5, 21% and performed for 30 sec total. At 4MOL, started on CPAP (max settings 5/40%) due to saturations in 60s%, and able to be weaned to RA by 20MOL. APGARS of 2/8. Mom plans to initiate breastfeeding, consents Hep B vaccine and consents circ.  Highest maternal temp: 36.5. EOS 0.04.    TOB: 11:21  BW: 2780 g    Physical Exam:  Gen: NAD, +grimace  HEENT: anterior fontanel open soft and flat, no cleft lip/palate, ears normal set, no ear pits or tags. no lesions in mouth/throat, nares clinically patent. +mild tongue tie   Resp: no increased work of breathing, good air entry b/l, clear to auscultation bilaterally  Cardio: Normal S1/S2, regular rate and rhythm, no murmurs, rubs or gallops  Abd: soft, non tender, non distended, + bowel sounds, umbilical cord with 3 vessels  Neuro: +grasp/suck/antolin, normal tone  Extremities: negative hernández and ortolani, moving all extremities, full range of motion x 4, no crepitus  Skin: pink, warm  Genitals: Normal male anatomy, testicles palpable in scrotum b/l, Max 1, anus patent

## 2023-12-28 NOTE — OB RN DELIVERY SUMMARY - NS_SEPSISRSKCALC_OBGYN_ALL_OB_FT
EOS calculated successfully. EOS Risk Factor: 0.5/1000 live births ([facility] national incidence); GA=37w6d; Temp=97.7; ROM=0.933; GBS='Negative'; Antibiotics='No antibiotics or any antibiotics < 2 hrs prior to birth'   EOS calculated successfully. EOS Risk Factor: 0.5/1000 live births ([facility] national incidence); GA=37w6d; Temp=97.7; ROM=0.933; GBS='Negative'; Antibiotics='No antibiotics or any antibiotics < 2 hrs prior to birth'

## 2023-12-28 NOTE — OB PROVIDER DELIVERY SUMMARY - NS_SCHEDBEFORE39_OBGYN_ALL_OB
I called patient to discuss scheduling follow up to review CT results, she is currently scheduled for 11/22.   I offered to schedule sooner appointment and she states she was told yesterday at appointment she would be called regarding these results shortly after testing was completed.    She is not sure if she will be following up any further.    Please advise.   Multiple Gestation

## 2023-12-28 NOTE — OB PROVIDER LABOR PROGRESS NOTE - NS_SUBJECTIVE/OBJECTIVE_OBGYN_ALL_OB_FT
Patient seen at bedside for cervical exam
R4 Labor Note    S: Patient evaluated at bedside for cervical change.     O:  T(C): 36.5 (12-28-23 @ 09:00), Max: 36.5 (12-27-23 @ 21:32)  HR: 71 (12-28-23 @ 10:19) (61 - 110)  BP: 125/72 (12-28-23 @ 10:09) (112/80 - 130/80)  RR: 16 (12-28-23 @ 09:00) (15 - 16)  SpO2: 100% (12-28-23 @ 10:19) (96% - 100%)

## 2023-12-28 NOTE — OB PROVIDER LABOR PROGRESS NOTE - NS_OBIHIFHRDETAILS_OBGYN_ALL_OB_FT
Baseline A:130 b: 120 moderate variability, +accels, - decels
A/B: Baseline 125, mod variability +accels -decels

## 2023-12-28 NOTE — OB RN DELIVERY SUMMARY - BABY B: STOOL IN DELIVERY
May 11, 2022    Patient: Alexus Guillen   Date of Visit: 5/11/2022       To Whom It May Concern:    Alexus Guillen was seen and treated in our emergency department on 5/11/2022. He should not return to school until Monday, 5/16, if feeling better. .    If you have any questions or concerns, please don't hesitate to call.        Encounter Provider(s):    Satinder Haas MD
no

## 2023-12-28 NOTE — OB RN DELIVERY SUMMARY - NSSELHIDDEN_OBGYN_ALL_OB_FT
[NS_DeliveryAttending1_OBGYN_ALL_OB_FT:[email or web address]],[NS_DeliveryAssist1_OBGYN_ALL_OB_FT:[id number]=],[NS_DeliveryRN_OBGYN_ALL_OB_FT:[email or web address]=] [NS_DeliveryAttending1_OBGYN_ALL_OB_FT:[email or web address]],[NS_DeliveryAssist1_OBGYN_ALL_OB_FT:[id number]=],[NS_DeliveryRN_OBGYN_ALL_OB_FT:[email or web address]=]

## 2023-12-28 NOTE — OB PROVIDER DELIVERY SUMMARY - NSSELHIDDEN_OBGYN_ALL_OB_FT
[NS_DeliveryAttending1_OBGYN_ALL_OB_FT:[email or web address]],[NS_DeliveryAssist1_OBGYN_ALL_OB_FT:[id number]=] [NS_DeliveryAttending1_OBGYN_ALL_OB_FT:[email or web address]],[NS_DeliveryAssist1_OBGYN_ALL_OB_FT:[id number]=]

## 2023-12-29 ENCOUNTER — TRANSCRIPTION ENCOUNTER (OUTPATIENT)
Age: 33
End: 2023-12-29

## 2023-12-29 LAB
T PALLIDUM AB TITR SER: NEGATIVE — SIGNIFICANT CHANGE UP
T PALLIDUM AB TITR SER: NEGATIVE — SIGNIFICANT CHANGE UP

## 2023-12-29 RX ORDER — PROGESTERONE 200 MG/1
1 CAPSULE, LIQUID FILLED ORAL
Refills: 0 | DISCHARGE

## 2023-12-29 RX ORDER — IBUPROFEN 200 MG
1 TABLET ORAL
Qty: 0 | Refills: 0 | DISCHARGE
Start: 2023-12-29

## 2023-12-29 RX ORDER — ASPIRIN/CALCIUM CARB/MAGNESIUM 324 MG
1 TABLET ORAL
Refills: 0 | DISCHARGE

## 2023-12-29 RX ORDER — NORETHINDRONE 0.35 MG/1
1 TABLET ORAL
Qty: 30 | Refills: 11
Start: 2023-12-29

## 2023-12-29 RX ORDER — ALBUTEROL 90 UG/1
2 AEROSOL, METERED ORAL EVERY 6 HOURS
Refills: 0 | Status: DISCONTINUED | OUTPATIENT
Start: 2023-12-29 | End: 2023-12-30

## 2023-12-29 RX ORDER — ACETAMINOPHEN 500 MG
3 TABLET ORAL
Qty: 0 | Refills: 0 | DISCHARGE
Start: 2023-12-29

## 2023-12-29 RX ORDER — LANOLIN
1 OINTMENT (GRAM) TOPICAL
Qty: 0 | Refills: 0 | DISCHARGE
Start: 2023-12-29

## 2023-12-29 RX ADMIN — Medication 600 MILLIGRAM(S): at 23:59

## 2023-12-29 RX ADMIN — Medication 975 MILLIGRAM(S): at 01:05

## 2023-12-29 RX ADMIN — Medication 975 MILLIGRAM(S): at 00:23

## 2023-12-29 RX ADMIN — Medication 600 MILLIGRAM(S): at 19:10

## 2023-12-29 RX ADMIN — Medication 600 MILLIGRAM(S): at 04:00

## 2023-12-29 RX ADMIN — Medication 600 MILLIGRAM(S): at 09:14

## 2023-12-29 RX ADMIN — SODIUM CHLORIDE 3 MILLILITER(S): 9 INJECTION INTRAMUSCULAR; INTRAVENOUS; SUBCUTANEOUS at 15:00

## 2023-12-29 RX ADMIN — Medication 1 TABLET(S): at 18:40

## 2023-12-29 RX ADMIN — SODIUM CHLORIDE 3 MILLILITER(S): 9 INJECTION INTRAMUSCULAR; INTRAVENOUS; SUBCUTANEOUS at 06:55

## 2023-12-29 RX ADMIN — Medication 975 MILLIGRAM(S): at 14:04

## 2023-12-29 RX ADMIN — Medication 600 MILLIGRAM(S): at 18:40

## 2023-12-29 RX ADMIN — Medication 975 MILLIGRAM(S): at 22:16

## 2023-12-29 RX ADMIN — Medication 975 MILLIGRAM(S): at 21:46

## 2023-12-29 RX ADMIN — Medication 600 MILLIGRAM(S): at 09:50

## 2023-12-29 RX ADMIN — Medication 975 MILLIGRAM(S): at 14:45

## 2023-12-29 RX ADMIN — Medication 600 MILLIGRAM(S): at 03:28

## 2023-12-29 RX ADMIN — Medication 975 MILLIGRAM(S): at 06:54

## 2023-12-29 NOTE — DISCHARGE NOTE OB - MEDICATION SUMMARY - MEDICATIONS TO TAKE
I will START or STAY ON the medications listed below when I get home from the hospital:    ibuprofen 600 mg oral tablet  -- 1 tab(s) by mouth every 6 hours as needed for  mild pain  -- Indication: For pain    acetaminophen 325 mg oral tablet  -- 3 tab(s) by mouth every 6 hours as needed for  mild pain  -- Indication: For pain    lanolin topical ointment  -- 1 Apply on skin to affected area every 6 hours As needed nipple soreness  -- Indication: For nipple soreness    Prenatal 1 oral capsule  -- 1 tab(s) by mouth once a day  -- Indication: For nutrition     norethindrone 0.35 mg oral tablet  -- 1 tab(s) by mouth once a day  -- Indication: For contraception

## 2023-12-29 NOTE — DISCHARGE NOTE OB - PROVIDER TOKENS
FREE:[LAST:[[facility]],PHONE:[phone]],FAX:[(   )    -],ADDRESS:[[address] [place] [address]],FOLLOWUP:[1 month],ESTABLISHEDPATIENT:[T]] FREE:[LAST:[[facility]],PHONE:[phone]:[(   )    -],ADDRESS:[[address] [place] [address]],FOLLOWUP:[1 month],ESTABLISHEDPATIENT:[T]]

## 2023-12-29 NOTE — DISCHARGE NOTE OB - CARE PLAN
Principal Discharge DX:	Vaginal delivery  Assessment and plan of treatment:	Make your follow-up appointment with your doctor as ordered.   No heavy lifting, driving, or strenuous activity for 6 weeks.  Nothing per vagina such as tampons, intercourse, douches or tub baths for 6 weeks or until you see your doctor.  Call your doctor if you're unable to tolerate food, increase in vaginal bleeding, or have difficulty urinating. Call your doctor if you have pain that is not relieved by your prescribed medications. Notify your doctor with any other concerns.    Please f/u in 4-6 weeks @Ambulatory Clinic Unit, [facility], Oncology Building, basement floor. Please call the office for an appointment ([phone])   Principal Discharge DX:	Vaginal delivery  Assessment and plan of treatment:	Make your follow-up appointment with your doctor as ordered.   No heavy lifting, driving, or strenuous activity for 6 weeks.  Nothing per vagina such as tampons, intercourse, douches or tub baths for 6 weeks or until you see your doctor.  Call your doctor if you're unable to tolerate food, increase in vaginal bleeding, or have difficulty urinating. Call your doctor if you have pain that is not relieved by your prescribed medications. Notify your doctor with any other concerns.    Please f/u in 4-6 weeks @Ambulatory Clinic Unit, [facility], Oncology Building, basement floor. Please call the office for an appointment ([phone])   1

## 2023-12-29 NOTE — DISCHARGE NOTE OB - NS MD DC FALL RISK RISK
For information on Fall & Injury Prevention, visit: [email or web address] OR  [email or web address] OR  https://www.cdc.gov/steadi/patient.html For information on Fall & Injury Prevention, visit: [email or web address] OR  [email or web address] OR  https://www.cdc.gov/steadi/patient.html

## 2023-12-29 NOTE — PROGRESS NOTE ADULT - ASSESSMENT
32y/o P1  PPD#1 from  in stable condition. Patient doing well. Current issues include      -Continue with PO analgesia  -OOB, increase ambulation   -Continue regular diet    -No labs    Anastasiia Coffey, PGY-1   34y/o P1  PPD#1 from  in stable condition. Patient doing well. Current issues include      -Continue with PO analgesia  -OOB, increase ambulation   -Continue regular diet    -No labs    Anastasiia Coffey, PGY-1

## 2023-12-29 NOTE — DISCHARGE NOTE OB - PLAN OF CARE
Make your follow-up appointment with your doctor as ordered.   No heavy lifting, driving, or strenuous activity for 6 weeks.  Nothing per vagina such as tampons, intercourse, douches or tub baths for 6 weeks or until you see your doctor.  Call your doctor if you're unable to tolerate food, increase in vaginal bleeding, or have difficulty urinating. Call your doctor if you have pain that is not relieved by your prescribed medications. Notify your doctor with any other concerns.    Please f/u in 4-6 weeks @Ambulatory Clinic Unit, [facility], Oncology Building, basement floor. Please call the office for an appointment ([phone]) Make your follow-up appointment with your doctor as ordered.   No heavy lifting, driving, or strenuous activity for 6 weeks.  Nothing per vagina such as tampons, intercourse, douches or tub baths for 6 weeks or until you see your doctor.  Call your doctor if you're unable to tolerate food, increase in vaginal bleeding, or have difficulty urinating. Call your doctor if you have pain that is not relieved by your prescribed medications. Notify your doctor with any other concerns.    Please f/u in 4-6 weeks @Ambulatory Clinic Unit, [facility], Oncology Building, basement floor. Please call the office for an appointment ([phone])

## 2023-12-29 NOTE — DISCHARGE NOTE OB - PATIENT PORTAL LINK FT
You can access the FollowMyHealth Patient Portal offered by [facility] by registering at the following website: [email or web address]. By joining [employer]’s FollowMyHealth portal, you will also be able to view your health information using other applications (apps) compatible with our system. You can access the FollowMyHealth Patient Portal offered by [facility] by registering at the following website: [email or web address]. By joining [employer]’s FollowMyHealth portal, you will also be able to view your health information using other applications (apps) compatible with our system.

## 2023-12-29 NOTE — DISCHARGE NOTE OB - CARE PROVIDER_API CALL
[facility],   [address] [place] [address] [place] [address]  Phone: [phone]  Fax: (   )    -  Established Patient  Follow Up Time: 1 month   [facility],   [address] [place] [address] [place] [address]  Phone: [phone]  Fax: (   )    -  Established Patient  Follow Up Time: 1 month

## 2023-12-29 NOTE — PROGRESS NOTE ADULT - SUBJECTIVE AND OBJECTIVE BOX
OB Progress Note:  PPD#1    S: 32yo PPD#1 s/p  of TIUP. Patient feels well. Pain is well controlled. She is tolerating a regular diet and passing flatus. She is voiding spontaneously, and ambulating without difficulty. Endorses light vaginal bleeding, soaking less than 1 pad/hour. Denies CP/SOB. Denies lightheadedness/dizziness. Denies N/V.     O:  Vitals:  Vital Signs Last 24 Hrs  T(C): 36.6 (29 Dec 2023 06:00), Max: 36.9 (29 Dec 2023 02:00)  T(F): 97.9 (29 Dec 2023 06:00), Max: 98.4 (29 Dec 2023 02:00)  HR: 78 (29 Dec 2023 06:00) (61 - 102)  BP: 132/84 (29 Dec 2023 06:00) (112/68 - 134/75)  BP(mean): --  RR: 18 (29 Dec 2023 06:00) (16 - 20)  SpO2: 99% (29 Dec 2023 06:00) (93% - 100%)    Parameters below as of 29 Dec 2023 06:00  Patient On (Oxygen Delivery Method): room air        MEDICATIONS  (STANDING):  acetaminophen     Tablet .. 975 milliGRAM(s) Oral <User Schedule>  diphtheria/tetanus/pertussis (acellular) Vaccine (Adacel) 0.5 milliLiter(s) IntraMuscular once  ibuprofen  Tablet. 600 milliGRAM(s) Oral every 6 hours  oxytocin Infusion 41.667 milliUNIT(s)/Min (125 mL/Hr) IV Continuous <Continuous>  oxytocin Infusion 333.333 milliUNIT(s)/Min (1000 mL/Hr) IV Continuous <Continuous>  prenatal multivitamin 1 Tablet(s) Oral daily  sodium chloride 0.9% lock flush 3 milliLiter(s) IV Push every 8 hours      Labs:  Blood type: O Positive  Rubella IgG: RPR: Negative                          13.9   11.84<H> >-----------< 279    ( 12-27 @ 22:40 )             40.2                  Physical Exam:  General: NAD  Heart: all extremities well perfused  Lungs: breathing comfortably  Abdomen: soft, non-tender, non-distended, fundus firm  Vaginal: lochia wnl  Extremities: No calf tenderness or erythema                 OB Progress Note:  PPD#1    S: 34yo PPD#1 s/p  of TIUP. Patient feels well. Pain is well controlled. She is tolerating a regular diet and passing flatus. She is voiding spontaneously, and ambulating without difficulty. Endorses light vaginal bleeding, soaking less than 1 pad/hour. Denies CP/SOB. Denies lightheadedness/dizziness. Denies N/V.     O:  Vitals:  Vital Signs Last 24 Hrs  T(C): 36.6 (29 Dec 2023 06:00), Max: 36.9 (29 Dec 2023 02:00)  T(F): 97.9 (29 Dec 2023 06:00), Max: 98.4 (29 Dec 2023 02:00)  HR: 78 (29 Dec 2023 06:00) (61 - 102)  BP: 132/84 (29 Dec 2023 06:00) (112/68 - 134/75)  BP(mean): --  RR: 18 (29 Dec 2023 06:00) (16 - 20)  SpO2: 99% (29 Dec 2023 06:00) (93% - 100%)    Parameters below as of 29 Dec 2023 06:00  Patient On (Oxygen Delivery Method): room air        MEDICATIONS  (STANDING):  acetaminophen     Tablet .. 975 milliGRAM(s) Oral <User Schedule>  diphtheria/tetanus/pertussis (acellular) Vaccine (Adacel) 0.5 milliLiter(s) IntraMuscular once  ibuprofen  Tablet. 600 milliGRAM(s) Oral every 6 hours  oxytocin Infusion 41.667 milliUNIT(s)/Min (125 mL/Hr) IV Continuous <Continuous>  oxytocin Infusion 333.333 milliUNIT(s)/Min (1000 mL/Hr) IV Continuous <Continuous>  prenatal multivitamin 1 Tablet(s) Oral daily  sodium chloride 0.9% lock flush 3 milliLiter(s) IV Push every 8 hours      Labs:  Blood type: O Positive  Rubella IgG: RPR: Negative                          13.9   11.84<H> >-----------< 279    ( 12-27 @ 22:40 )             40.2                  Physical Exam:  General: NAD  Heart: all extremities well perfused  Lungs: breathing comfortably  Abdomen: soft, non-tender, non-distended, fundus firm  Vaginal: lochia wnl  Extremities: No calf tenderness or erythema

## 2023-12-29 NOTE — DISCHARGE NOTE OB - HOSPITAL COURSE
Patient was admitted to L+D for , Pt had an uncomplicated  of TIUP followed by an uncomplicated postpartum course.  QBL: 358  Hct: 40.2  On Postpartum day 2, patient was discharged home in stable condition, voiding spontaneously, pain well controlled, ambulating, tolerating PO and with normal vital signs. Patient's postpartum birth control plan is POPs. Pt plans to follow up in the [facility] in 6 weeks. Telephone number and clinic information provided prior to discharge.  Patient was admitted to L+D for , Pt had an uncomplicated  of TIUP followed by an uncomplicated postpartum course.  QBL: 358  Hct: 40.2  On Postpartum day 2, patient was discharged home in stable condition, voiding spontaneously, pain well controlled, ambulating, tolerating PO and with normal vital signs. Patient's postpartum birth control plan is POPs. Pt plans to follow up in the [facility] in 6 weeks. Telephone number and clinic information provided prior to discharge.

## 2023-12-29 NOTE — PROGRESS NOTE ADULT - SUBJECTIVE AND OBJECTIVE BOX
OB Progress Note:  PPD#1    S: 34yo PPD#1 s/p  with di/di twins. Patient feels well. Pain is well controlled. She is tolerating a regular diet and passing flatus. She is voiding spontaneously, and ambulating without difficulty. Endorses light vaginal bleeding, soaking less than 1 pad/hour. Denies CP/SOB. Denies lightheadedness/dizziness. Denies N/V.     O:  Vitals:  Vital Signs Last 24 Hrs  T(C): 36.6 (29 Dec 2023 06:00), Max: 36.9 (29 Dec 2023 02:00)  T(F): 97.9 (29 Dec 2023 06:00), Max: 98.4 (29 Dec 2023 02:00)  HR: 78 (29 Dec 2023 06:00) (61 - 102)  BP: 132/84 (29 Dec 2023 06:00) (112/68 - 134/75)  BP(mean): --  RR: 18 (29 Dec 2023 06:00) (16 - 20)  SpO2: 99% (29 Dec 2023 06:00) (93% - 100%)    Parameters below as of 29 Dec 2023 06:00  Patient On (Oxygen Delivery Method): room air        MEDICATIONS  (STANDING):  acetaminophen     Tablet .. 975 milliGRAM(s) Oral <User Schedule>  diphtheria/tetanus/pertussis (acellular) Vaccine (Adacel) 0.5 milliLiter(s) IntraMuscular once  ibuprofen  Tablet. 600 milliGRAM(s) Oral every 6 hours  oxytocin Infusion 41.667 milliUNIT(s)/Min (125 mL/Hr) IV Continuous <Continuous>  oxytocin Infusion 333.333 milliUNIT(s)/Min (1000 mL/Hr) IV Continuous <Continuous>  prenatal multivitamin 1 Tablet(s) Oral daily  sodium chloride 0.9% lock flush 3 milliLiter(s) IV Push every 8 hours      Labs:  Blood type: O Positive  Rubella IgG: RPR: Negative                          13.9   11.84<H> >-----------< 279    ( 12-27 @ 22:40 )             40.2                  Physical Exam:  General: NAD  Heart: all extremities well perfused  Lungs: breathing comfortably  Abdomen: soft, non-tender, non-distended, fundus firm  Vaginal: lochia wnl  Extremities: No calf tenderness or erythema

## 2023-12-29 NOTE — PROGRESS NOTE ADULT - ASSESSMENT
32y/o   PPD#1 from  of TIUP vtx/breech in stable condition. Patient doing well.     -Continue with PO analgesia  -OOB, increase ambulation   -Continue regular diet    -QBL: 358, Hct: 40.2  -No labs  -Contraception plan: POPs  -Discharge planning for tomorrow    Graciela Brito,  PGY-1 34y/o   PPD#1 from  of TIUP vtx/breech in stable condition. Patient doing well.     -Continue with PO analgesia  -OOB, increase ambulation   -Continue regular diet    -QBL: 358, Hct: 40.2  -No labs  -Contraception plan: POPs  -Discharge planning for tomorrow    Graciela Brito,  PGY-1

## 2023-12-29 NOTE — DISCHARGE NOTE OB - MEDICATION SUMMARY - MEDICATIONS TO STOP TAKING
I will STOP taking the medications listed below when I get home from the hospital:    aspirin 81 mg oral capsule  -- 1 cap(s) by mouth once a day    progesterone 200 mg vaginal suppository  -- 1 suppository(ies) intravaginally once a day (at bedtime)

## 2023-12-30 VITALS
SYSTOLIC BLOOD PRESSURE: 132 MMHG | DIASTOLIC BLOOD PRESSURE: 82 MMHG | HEART RATE: 75 BPM | OXYGEN SATURATION: 99 % | TEMPERATURE: 98 F | RESPIRATION RATE: 18 BRPM

## 2023-12-30 RX ORDER — LANOLIN
1 OINTMENT (GRAM) TOPICAL
Refills: 0 | Status: DISCONTINUED | OUTPATIENT
Start: 2023-12-30 | End: 2023-12-30

## 2023-12-30 RX ADMIN — Medication 600 MILLIGRAM(S): at 22:37

## 2023-12-30 RX ADMIN — Medication 600 MILLIGRAM(S): at 15:08

## 2023-12-30 RX ADMIN — Medication 975 MILLIGRAM(S): at 19:58

## 2023-12-30 RX ADMIN — Medication 975 MILLIGRAM(S): at 18:51

## 2023-12-30 RX ADMIN — Medication 1 TABLET(S): at 15:08

## 2023-12-30 RX ADMIN — Medication 600 MILLIGRAM(S): at 22:07

## 2023-12-30 RX ADMIN — Medication 975 MILLIGRAM(S): at 09:33

## 2023-12-30 RX ADMIN — Medication 975 MILLIGRAM(S): at 10:10

## 2023-12-30 RX ADMIN — Medication 975 MILLIGRAM(S): at 02:57

## 2023-12-30 RX ADMIN — Medication 600 MILLIGRAM(S): at 06:57

## 2023-12-30 RX ADMIN — Medication 600 MILLIGRAM(S): at 16:00

## 2023-12-30 RX ADMIN — Medication 600 MILLIGRAM(S): at 00:29

## 2023-12-30 RX ADMIN — Medication 975 MILLIGRAM(S): at 03:27

## 2023-12-30 RX ADMIN — Medication 600 MILLIGRAM(S): at 06:10

## 2023-12-30 NOTE — PROGRESS NOTE ADULT - ASSESSMENT
A/P: 32yo PPD#2 s/p .  Patient is stable and doing well post-partum.    - Pain well controlled, continue current pain regimen  - Increase ambulation, SCDs when not ambulating  - Continue regular diet  - Baby boy has been circumcized.  - Desires OCPs upon discharge. Prescription written.  - Order placed for lanolin nipple cream to be applied PRN.  - Discharge planning     D/w and evaluated with Dr. Venecia Smith, PGY1   A/P: 34yo PPD#2 s/p .  Patient is stable and doing well post-partum.    - Pain well controlled, continue current pain regimen  - Increase ambulation, SCDs when not ambulating  - Continue regular diet  - Baby boy has been circumcized.  - Desires OCPs upon discharge. Prescription written.  - Order placed for lanolin nipple cream to be applied PRN.  - Discharge planning     D/w and evaluated with Dr. Venecia Smith, PGY1

## 2023-12-30 NOTE — PROGRESS NOTE ADULT - ATTENDING COMMENTS
PPD2 s/p  of TIUP   Doing well, afebrile, VSS  Stable for discharge home    Kevin Cuadra MD  Covering OB [facility] Attending
Attending Note   Agree with above  wants to d/c home in AM     R Michael IRVIN

## 2023-12-30 NOTE — PROGRESS NOTE ADULT - SUBJECTIVE AND OBJECTIVE BOX
OB Progress Note:  PPD#2    S: 32yo PPD#2 s/p . Patient feels well. Pain is well controlled, tolerating regular diet, passing flatus, voiding spontaneously, ambulating without difficulty. Denies heavy vaginal bleeding, CP/SOB, lightheadedness/dizziness, N/V.  Only complains of mild nipple pain/irritation secondary to breast feeding.    O:  Vitals:   Vital Signs Last 24 Hrs  T(C): 36.6 (30 Dec 2023 06:04), Max: 36.8 (29 Dec 2023 18:18)  T(F): 97.8 (30 Dec 2023 06:04), Max: 98.3 (29 Dec 2023 18:18)  HR: 60 (30 Dec 2023 06:04) (60 - 71)  BP: 125/82 (30 Dec 2023 06:04) (125/82 - 135/80)  BP(mean): --  RR: 18 (30 Dec 2023 06:04) (18 - 18)  SpO2: 99% (30 Dec 2023 06:04) (99% - 99%)    Parameters below as of 30 Dec 2023 06:04  Patient On (Oxygen Delivery Method): room air        MEDICATIONS  (STANDING):  acetaminophen     Tablet .. 975 milliGRAM(s) Oral <User Schedule>  diphtheria/tetanus/pertussis (acellular) Vaccine (Adacel) 0.5 milliLiter(s) IntraMuscular once  ibuprofen  Tablet. 600 milliGRAM(s) Oral every 6 hours  prenatal multivitamin 1 Tablet(s) Oral daily  sodium chloride 0.9% lock flush 3 milliLiter(s) IV Push every 8 hours      MEDICATIONS  (PRN):  albuterol    90 MICROgram(s) HFA Inhaler 2 Puff(s) Inhalation every 6 hours PRN Shortness of Breath and/or Wheezing  benzocaine 20%/menthol 0.5% Spray 1 Spray(s) Topical every 6 hours PRN for Perineal discomfort  dibucaine 1% Ointment 1 Application(s) Topical every 6 hours PRN Perineal discomfort  diphenhydrAMINE 25 milliGRAM(s) Oral every 6 hours PRN Pruritus  hydrocortisone 1% Cream 1 Application(s) Topical every 6 hours PRN Moderate Pain (4-6)  lanolin Ointment 1 Application(s) Topical every 6 hours PRN nipple soreness  magnesium hydroxide Suspension 30 milliLiter(s) Oral two times a day PRN Constipation  oxyCODONE    IR 5 milliGRAM(s) Oral once PRN Moderate to Severe Pain (4-10)  oxyCODONE    IR 5 milliGRAM(s) Oral every 3 hours PRN Moderate to Severe Pain (4-10)  pramoxine 1%/zinc 5% Cream 1 Application(s) Topical every 4 hours PRN Moderate Pain (4-6)  simethicone 80 milliGRAM(s) Chew every 4 hours PRN Gas  witch hazel Pads 1 Application(s) Topical every 4 hours PRN Perineal discomfort        Labs:  Blood type: O Positive  Rubella IgG: RPR: Negative                          13.9   11.84<H> >-----------< 279    ( 12-27 @ 22:40 )             40.2                  Physical Exam:  General: NAD  Abdomen: soft, non-tender, non-distended, fundus firm  Vaginal: No heavy vaginal bleeding  Extremities: No erythema/edema   OB Progress Note:  PPD#2    S: 34yo PPD#2 s/p . Patient feels well. Pain is well controlled, tolerating regular diet, passing flatus, voiding spontaneously, ambulating without difficulty. Denies heavy vaginal bleeding, CP/SOB, lightheadedness/dizziness, N/V.  Only complains of mild nipple pain/irritation secondary to breast feeding.    O:  Vitals:   Vital Signs Last 24 Hrs  T(C): 36.6 (30 Dec 2023 06:04), Max: 36.8 (29 Dec 2023 18:18)  T(F): 97.8 (30 Dec 2023 06:04), Max: 98.3 (29 Dec 2023 18:18)  HR: 60 (30 Dec 2023 06:04) (60 - 71)  BP: 125/82 (30 Dec 2023 06:04) (125/82 - 135/80)  BP(mean): --  RR: 18 (30 Dec 2023 06:04) (18 - 18)  SpO2: 99% (30 Dec 2023 06:04) (99% - 99%)    Parameters below as of 30 Dec 2023 06:04  Patient On (Oxygen Delivery Method): room air        MEDICATIONS  (STANDING):  acetaminophen     Tablet .. 975 milliGRAM(s) Oral <User Schedule>  diphtheria/tetanus/pertussis (acellular) Vaccine (Adacel) 0.5 milliLiter(s) IntraMuscular once  ibuprofen  Tablet. 600 milliGRAM(s) Oral every 6 hours  prenatal multivitamin 1 Tablet(s) Oral daily  sodium chloride 0.9% lock flush 3 milliLiter(s) IV Push every 8 hours      MEDICATIONS  (PRN):  albuterol    90 MICROgram(s) HFA Inhaler 2 Puff(s) Inhalation every 6 hours PRN Shortness of Breath and/or Wheezing  benzocaine 20%/menthol 0.5% Spray 1 Spray(s) Topical every 6 hours PRN for Perineal discomfort  dibucaine 1% Ointment 1 Application(s) Topical every 6 hours PRN Perineal discomfort  diphenhydrAMINE 25 milliGRAM(s) Oral every 6 hours PRN Pruritus  hydrocortisone 1% Cream 1 Application(s) Topical every 6 hours PRN Moderate Pain (4-6)  lanolin Ointment 1 Application(s) Topical every 6 hours PRN nipple soreness  magnesium hydroxide Suspension 30 milliLiter(s) Oral two times a day PRN Constipation  oxyCODONE    IR 5 milliGRAM(s) Oral once PRN Moderate to Severe Pain (4-10)  oxyCODONE    IR 5 milliGRAM(s) Oral every 3 hours PRN Moderate to Severe Pain (4-10)  pramoxine 1%/zinc 5% Cream 1 Application(s) Topical every 4 hours PRN Moderate Pain (4-6)  simethicone 80 milliGRAM(s) Chew every 4 hours PRN Gas  witch hazel Pads 1 Application(s) Topical every 4 hours PRN Perineal discomfort        Labs:  Blood type: O Positive  Rubella IgG: RPR: Negative                          13.9   11.84<H> >-----------< 279    ( 12-27 @ 22:40 )             40.2                  Physical Exam:  General: NAD  Abdomen: soft, non-tender, non-distended, fundus firm  Vaginal: No heavy vaginal bleeding  Extremities: No erythema/edema

## 2024-01-03 ENCOUNTER — APPOINTMENT (OUTPATIENT)
Age: 34
End: 2024-01-03
Payer: MEDICAID

## 2024-01-03 PROCEDURE — S9445: CPT | Mod: 95

## 2024-01-09 ENCOUNTER — NON-APPOINTMENT (OUTPATIENT)
Age: 34
End: 2024-01-09

## 2024-01-21 LAB — SURGICAL PATHOLOGY STUDY: SIGNIFICANT CHANGE UP

## 2024-02-08 ENCOUNTER — APPOINTMENT (OUTPATIENT)
Dept: OBGYN | Facility: HOSPITAL | Age: 34
End: 2024-02-08

## 2024-02-08 ENCOUNTER — OUTPATIENT (OUTPATIENT)
Dept: OUTPATIENT SERVICES | Facility: HOSPITAL | Age: 34
LOS: 1 days | End: 2024-02-08

## 2024-02-08 ENCOUNTER — RESULT REVIEW (OUTPATIENT)
Age: 34
End: 2024-02-08

## 2024-02-08 VITALS
HEIGHT: 66 IN | BODY MASS INDEX: 25.71 KG/M2 | HEART RATE: 82 BPM | WEIGHT: 160 LBS | DIASTOLIC BLOOD PRESSURE: 70 MMHG | TEMPERATURE: 98.6 F | SYSTOLIC BLOOD PRESSURE: 124 MMHG

## 2024-02-08 DIAGNOSIS — Z30.011 ENCOUNTER FOR INITIAL PRESCRIPTION OF CONTRACEPTIVE PILLS: ICD-10-CM

## 2024-02-08 DIAGNOSIS — Z92.29 PERSONAL HISTORY OF OTHER DRUG THERAPY: ICD-10-CM

## 2024-02-08 DIAGNOSIS — Z33.2 ENCOUNTER FOR ELECTIVE TERMINATION OF PREGNANCY: Chronic | ICD-10-CM

## 2024-02-08 DIAGNOSIS — Z87.19 PERSONAL HISTORY OF OTHER DISEASES OF THE DIGESTIVE SYSTEM: ICD-10-CM

## 2024-02-08 DIAGNOSIS — Z98.890 OTHER SPECIFIED POSTPROCEDURAL STATES: Chronic | ICD-10-CM

## 2024-02-08 DIAGNOSIS — O30.049 TWIN PREGNANCY, DICHORIONIC/DIAMNIOTIC, UNSPECIFIED TRIMESTER: ICD-10-CM

## 2024-02-08 DIAGNOSIS — O28.3 ABNORMAL ULTRASONIC FINDING ON ANTENATAL SCREENING OF MOTHER: ICD-10-CM

## 2024-02-08 DIAGNOSIS — Z23 ENCOUNTER FOR IMMUNIZATION: ICD-10-CM

## 2024-02-08 LAB
HCT VFR BLD CALC: 43 % — SIGNIFICANT CHANGE UP (ref 34.5–45)
HGB BLD-MCNC: 14.5 G/DL — SIGNIFICANT CHANGE UP (ref 11.5–15.5)
MCHC RBC-ENTMCNC: 31.3 PG — SIGNIFICANT CHANGE UP (ref 27–34)
MCHC RBC-ENTMCNC: 33.7 GM/DL — SIGNIFICANT CHANGE UP (ref 32–36)
MCV RBC AUTO: 92.9 FL — SIGNIFICANT CHANGE UP (ref 80–100)
NRBC # BLD: 0 /100 WBCS — SIGNIFICANT CHANGE UP (ref 0–0)
NRBC # FLD: 0 K/UL — SIGNIFICANT CHANGE UP (ref 0–0)
PLATELET # BLD AUTO: 285 K/UL — SIGNIFICANT CHANGE UP (ref 150–400)
RBC # BLD: 4.63 M/UL — SIGNIFICANT CHANGE UP (ref 3.8–5.2)
RBC # FLD: 11.8 % — SIGNIFICANT CHANGE UP (ref 10.3–14.5)
WBC # BLD: 9.43 K/UL — SIGNIFICANT CHANGE UP (ref 3.8–10.5)
WBC # FLD AUTO: 9.43 K/UL — SIGNIFICANT CHANGE UP (ref 3.8–10.5)

## 2024-02-08 RX ORDER — NORETHINDRONE 0.35 MG/1
0.35 TABLET ORAL DAILY
Qty: 1 | Refills: 5 | Status: ACTIVE | COMMUNITY
Start: 2024-02-08 | End: 1900-01-01

## 2024-02-08 NOTE — HISTORY OF PRESENT ILLNESS
[Postpartum Follow Up] : postpartum follow up [Complications:___] : complications include: [unfilled] [Last Pap Date: ___] : Last Pap Date: [unfilled] [Delivery Date: ___] : on [unfilled] [] : delivered by vaginal delivery [Multiples: ___] : Delivery History: [unfilled] babies [Wt. ___] : weighing [unfilled] [Rhogam] : Rhogam was not administered [Rubella Vaccine] : Rubella vaccine was not administered [Pertussis Vaccine] : Pertussis vaccine administered [BTL] : no tubal ligation [Breastfeeding] : currently nursing [Discharge HCT: ___] : hematocrit level was [unfilled] [Discharge HGB: ___] : hemoglobin level was [unfilled] [Resumed Menses] : has resumed her menses [Resumed [place]] : has not resumed intercourse [Intended Contraception] : Intended Contraception: [Oral Contraceptives] : oral contraceptives [S/Sx PP Depression] : no signs/symptoms of postpartum depression [Back to Normal] : is back to normal in size [None] : no vaginal bleeding [Normal] : the vagina was normal [Examination Of The Breasts] : breasts are normal [Doing Well] : is doing well [FreeTextEntry8] : Here for postpartum exam .  twins 23 [de-identified] : Rx Norethindrone x 6 months [de-identified] : Breastfeeding/happy with baby. Rx Prenatal vit 1 tab po OD. CBC for [facility]. RX Norethindrone x 6months- pt verbalized understanding med regime. RTC 6 months. MHEgarty NP

## 2024-02-12 DIAGNOSIS — Z30.011 ENCOUNTER FOR INITIAL PRESCRIPTION OF CONTRACEPTIVE PILLS: ICD-10-CM

## 2024-02-12 DIAGNOSIS — O30.009 TWIN PREGNANCY, UNSPECIFIED NUMBER OF PLACENTA AND UNSPECIFIED NUMBER OF AMNIOTIC SACS, UNSPECIFIED TRIMESTER: ICD-10-CM

## 2024-04-03 NOTE — OB RN PATIENT PROFILE - NSTRANFUSIONOBJECTION_GEN_ALL_CORE_SIUH
[Routine Follow-Up] : a routine follow-up visit for [Parents] : parents [Asthma/RAD] : asthma/RAD [Mother] : mother [Medical Records] : medical records Patient has no objection to blood transfusions.

## 2024-04-05 NOTE — OB PROVIDER TRIAGE NOTE - NSICDXPASTSURGICALHX_GEN_ALL_CORE_FT
Left Effusion PAST SURGICAL HISTORY:  History of dilation and curettage     Termination of pregnancy (fetus)

## 2024-04-25 NOTE — OB RN PATIENT PROFILE - MENTAL HEALTH CONDITIONS/SYMPTOMS, PROFILE
Chronic issue with bilateral leg radiation , Denies numbness/ tingling, incontinence, falls, weakness, fever, saddle anesthesia.   Recommend lumbar x-ray, PT  Can use OTC NSAIDs, acetaminophen      none

## 2024-05-16 ENCOUNTER — RESULT REVIEW (OUTPATIENT)
Age: 34
End: 2024-05-16

## 2024-05-16 ENCOUNTER — OUTPATIENT (OUTPATIENT)
Dept: OUTPATIENT SERVICES | Facility: HOSPITAL | Age: 34
LOS: 1 days | End: 2024-05-16

## 2024-05-16 ENCOUNTER — APPOINTMENT (OUTPATIENT)
Dept: OBGYN | Facility: HOSPITAL | Age: 34
End: 2024-05-16
Payer: MEDICAID

## 2024-05-16 VITALS
SYSTOLIC BLOOD PRESSURE: 121 MMHG | BODY MASS INDEX: 26.36 KG/M2 | HEART RATE: 66 BPM | TEMPERATURE: 98 F | HEIGHT: 66 IN | WEIGHT: 164 LBS | DIASTOLIC BLOOD PRESSURE: 75 MMHG

## 2024-05-16 DIAGNOSIS — Z30.09 ENCOUNTER FOR OTHER GENERAL COUNSELING AND ADVICE ON CONTRACEPTION: ICD-10-CM

## 2024-05-16 DIAGNOSIS — Z01.419 ENCOUNTER FOR GYNECOLOGICAL EXAMINATION (GENERAL) (ROUTINE) W/OUT ABNORMAL FINDINGS: ICD-10-CM

## 2024-05-16 DIAGNOSIS — Z33.2 ENCOUNTER FOR ELECTIVE TERMINATION OF PREGNANCY: Chronic | ICD-10-CM

## 2024-05-16 DIAGNOSIS — N76.0 ACUTE VAGINITIS: ICD-10-CM

## 2024-05-16 DIAGNOSIS — B96.89 ACUTE VAGINITIS: ICD-10-CM

## 2024-05-16 DIAGNOSIS — Z98.890 OTHER SPECIFIED POSTPROCEDURAL STATES: Chronic | ICD-10-CM

## 2024-05-16 LAB
CANDIDA AB TITR SER: SIGNIFICANT CHANGE UP
G VAGINALIS DNA SPEC QL NAA+PROBE: DETECTED
T VAGINALIS SPEC QL WET PREP: SIGNIFICANT CHANGE UP

## 2024-05-16 PROCEDURE — 99395 PREV VISIT EST AGE 18-39: CPT | Mod: 25

## 2024-05-16 RX ORDER — CLINDAMYCIN PHOSPHATE 20 MG/G
2 CREAM VAGINAL
Qty: 1 | Refills: 1 | Status: ACTIVE | COMMUNITY
Start: 2024-05-16 | End: 1900-01-01

## 2024-05-16 RX ORDER — FLUCONAZOLE 150 MG/1
150 TABLET ORAL DAILY
Qty: 1 | Refills: 1 | Status: ACTIVE | COMMUNITY
Start: 2024-05-16 | End: 1900-01-01

## 2024-05-16 NOTE — HISTORY OF PRESENT ILLNESS
[Patient reported PAP Smear was normal] : Patient reported PAP Smear was normal [Gonorrhea test offered] : Gonorrhea test offered [Chlamydia test offered] : Chlamydia test offered [Trichomonas test offered] : Trichomonas test offered [Irregular Cycle Intervals] : are  irregular [Oral Contraceptive] : uses oral contraception pills [Y] : Positive pregnancy history [Currently Active] : currently active [Men] : men [Vaginal] : vaginal [No] : No [Yes] : Yes [Patient would like to be screened for STIs] : Patient would like to be screened for STIs [TextBox_4] : 35 yo  LMP 5/6/24 here for annual Gyn exam.  c/o vaginal irritation and friction with intercourse.  Uses norethindrone POP's and having irregular cycles.  Breast feeding twins. [PapSmeardate] : 06/23 [HIVDate] : 04/23 [TextBox_53] : tested with PCP [SyphilisDate] : 04/23 [TextBox_58] : tested with PCP [HepatitisBDate] : 04/23 [TextBox_83] : tested with PCP [HepatitisCDate] : 04/23 [TextBox_88] : tested with PCP [PGHxTotal] : 3 [[facility] : 2 [PGHxAbortions] : 1 [[facility]] : 3 [PGHxMultBirths] : 1 [FreeTextEntry3] : Norethindrone

## 2024-05-16 NOTE — DISCUSSION/SUMMARY
[FreeTextEntry1] : Annual Gyn  --GC/Chlam done --Refuses all blood work--states she recently had all done with PCP --SBE monthly --Diet and exercise  Bacterial Vaginosis --discharge and symptoms c/w BV --Affirm done --Clindamycin cream QHS --Fluconazole PO once --Vag care and hygiene discussed  Family planning --Birth control options reviewed with patient --Patient happy with norethindrone as she is still nursing --Recently got 6 month refill from PCP --Explained need to switch to RICH when she stops nursing --STD counseling, safe sex practices and condoms advised Follow up for annual/prn

## 2024-05-16 NOTE — PHYSICAL EXAM
[Chaperone Present] : A chaperone was present in the examining room during all aspects of the physical examination [12303] : A chaperone was present during the pelvic exam. [Appropriately responsive] : appropriately responsive [Alert] : alert [No Acute Distress] : no acute distress [No Lymphadenopathy] : no lymphadenopathy [Soft] : soft [Non-tender] : non-tender [Non-distended] : non-distended [No HSM] : No HSM [No Lesions] : no lesions [No Mass] : no mass [Oriented x3] : oriented x3 [Examination Of The Breasts] : a normal appearance [No Masses] : no breast masses were palpable [Labia Majora] : normal [Labia Minora] : normal [Discharge] : a  ~M vaginal discharge was present [Moderate] : moderate [Gustavo] : yellow [Mucoid] : mucoid [Normal] : normal [Uterine Adnexae] : normal [FreeTextEntry2] : Skye

## 2024-05-17 LAB
C TRACH RRNA SPEC QL NAA+PROBE: SIGNIFICANT CHANGE UP
N GONORRHOEA RRNA SPEC QL NAA+PROBE: SIGNIFICANT CHANGE UP
SPECIMEN SOURCE: SIGNIFICANT CHANGE UP

## 2024-05-29 DIAGNOSIS — Z30.09 ENCOUNTER FOR OTHER GENERAL COUNSELING AND ADVICE ON CONTRACEPTION: ICD-10-CM

## 2024-05-29 DIAGNOSIS — Z01.419 ENCOUNTER FOR GYNECOLOGICAL EXAMINATION (GENERAL) (ROUTINE) WITHOUT ABNORMAL FINDINGS: ICD-10-CM

## 2024-05-29 DIAGNOSIS — N76.0 ACUTE VAGINITIS: ICD-10-CM

## 2024-06-24 ENCOUNTER — APPOINTMENT (OUTPATIENT)
Dept: MATERNAL FETAL MEDICINE | Facility: HOSPITAL | Age: 34
End: 2024-06-24

## 2024-12-31 NOTE — OB RN PATIENT PROFILE - AGENT'S NAME
Alert-The patient is alert, awake and responds to voice. The patient is oriented to time, place, and person. The triage nurse is able to obtain subjective information. Abigail Streeter

## 2025-05-22 ENCOUNTER — OUTPATIENT (OUTPATIENT)
Dept: OUTPATIENT SERVICES | Facility: HOSPITAL | Age: 35
LOS: 1 days | End: 2025-05-22

## 2025-05-22 ENCOUNTER — APPOINTMENT (OUTPATIENT)
Dept: OBGYN | Facility: HOSPITAL | Age: 35
End: 2025-05-22

## 2025-05-22 ENCOUNTER — RESULT REVIEW (OUTPATIENT)
Age: 35
End: 2025-05-22

## 2025-05-22 VITALS
TEMPERATURE: 98.4 F | BODY MASS INDEX: 25.76 KG/M2 | SYSTOLIC BLOOD PRESSURE: 119 MMHG | WEIGHT: 160.3 LBS | HEART RATE: 68 BPM | HEIGHT: 66 IN | DIASTOLIC BLOOD PRESSURE: 80 MMHG

## 2025-05-22 DIAGNOSIS — Z33.2 ENCOUNTER FOR ELECTIVE TERMINATION OF PREGNANCY: Chronic | ICD-10-CM

## 2025-05-22 DIAGNOSIS — Z30.09 ENCOUNTER FOR OTHER GENERAL COUNSELING AND ADVICE ON CONTRACEPTION: ICD-10-CM

## 2025-05-22 DIAGNOSIS — Z01.419 ENCOUNTER FOR GYNECOLOGICAL EXAMINATION (GENERAL) (ROUTINE) W/OUT ABNORMAL FINDINGS: ICD-10-CM

## 2025-05-22 DIAGNOSIS — Z98.890 OTHER SPECIFIED POSTPROCEDURAL STATES: Chronic | ICD-10-CM

## 2025-05-22 PROCEDURE — 99395 PREV VISIT EST AGE 18-39: CPT

## 2025-05-23 LAB
C TRACH RRNA SPEC QL NAA+PROBE: SIGNIFICANT CHANGE UP
N GONORRHOEA RRNA SPEC QL NAA+PROBE: SIGNIFICANT CHANGE UP
SPECIMEN SOURCE: SIGNIFICANT CHANGE UP
T VAGINALIS RRNA SPEC QL NAA+PROBE: SIGNIFICANT CHANGE UP

## 2025-05-27 DIAGNOSIS — Z30.09 ENCOUNTER FOR OTHER GENERAL COUNSELING AND ADVICE ON CONTRACEPTION: ICD-10-CM

## 2025-05-27 DIAGNOSIS — Z01.419 ENCOUNTER FOR GYNECOLOGICAL EXAMINATION (GENERAL) (ROUTINE) WITHOUT ABNORMAL FINDINGS: ICD-10-CM
